# Patient Record
Sex: FEMALE | Race: WHITE | Employment: UNEMPLOYED | URBAN - METROPOLITAN AREA
[De-identification: names, ages, dates, MRNs, and addresses within clinical notes are randomized per-mention and may not be internally consistent; named-entity substitution may affect disease eponyms.]

---

## 2017-04-24 ENCOUNTER — HOSPITAL ENCOUNTER (OUTPATIENT)
Dept: CARDIAC CATH/INVASIVE PROCEDURES | Age: 62
Discharge: HOME | End: 2017-04-24
Attending: ORTHOPAEDIC SURGERY | Admitting: ORTHOPAEDIC SURGERY

## 2017-04-24 DIAGNOSIS — M51.34 BULGE OF THORACIC DISC WITHOUT MYELOPATHY: ICD-10-CM

## 2017-05-24 ENCOUNTER — OFFICE VISIT (OUTPATIENT)
Dept: ORTHOPEDIC SURGERY | Age: 62
End: 2017-05-24

## 2017-05-24 VITALS
DIASTOLIC BLOOD PRESSURE: 79 MMHG | WEIGHT: 166 LBS | HEIGHT: 68 IN | HEART RATE: 80 BPM | BODY MASS INDEX: 25.16 KG/M2 | SYSTOLIC BLOOD PRESSURE: 116 MMHG

## 2017-05-24 DIAGNOSIS — M54.12 CERVICAL RADICULOPATHY: ICD-10-CM

## 2017-05-24 DIAGNOSIS — M48.02 SPINAL STENOSIS OF CERVICAL REGION: ICD-10-CM

## 2017-05-24 DIAGNOSIS — Z80.3 FAMILY HISTORY OF BREAST CANCER: ICD-10-CM

## 2017-05-24 DIAGNOSIS — M75.41 SHOULDER IMPINGEMENT SYNDROME, RIGHT: ICD-10-CM

## 2017-05-24 DIAGNOSIS — M25.511 RIGHT SHOULDER PAIN, UNSPECIFIED CHRONICITY: Primary | ICD-10-CM

## 2017-05-24 DIAGNOSIS — R07.89 CHEST WALL PAIN: ICD-10-CM

## 2017-05-24 PROBLEM — M75.40 SHOULDER IMPINGEMENT SYNDROME: Status: ACTIVE | Noted: 2017-05-24

## 2017-05-24 PROBLEM — M47.812 OSTEOARTHRITIS OF CERVICAL SPINE: Status: ACTIVE | Noted: 2017-05-24

## 2017-05-24 PROCEDURE — 73030 X-RAY EXAM OF SHOULDER: CPT | Performed by: ORTHOPAEDIC SURGERY

## 2017-05-24 PROCEDURE — 99243 OFF/OP CNSLTJ NEW/EST LOW 30: CPT | Performed by: ORTHOPAEDIC SURGERY

## 2017-05-31 ENCOUNTER — HOSPITAL ENCOUNTER (OUTPATIENT)
Dept: NUCLEAR MEDICINE | Age: 62
Discharge: HOME | End: 2017-05-31
Attending: ORTHOPAEDIC SURGERY | Admitting: ORTHOPAEDIC SURGERY

## 2017-05-31 DIAGNOSIS — R07.89 OTHER CHEST PAIN: ICD-10-CM

## 2017-05-31 DIAGNOSIS — Z80.3 FAMILY HISTORY OF BREAST CANCER: ICD-10-CM

## 2017-05-31 DIAGNOSIS — R07.89 CHEST WALL PAIN: ICD-10-CM

## 2017-05-31 RX ORDER — TC 99M MEDRONATE 20 MG/10ML
28.1 INJECTION, POWDER, LYOPHILIZED, FOR SOLUTION INTRAVENOUS
Status: COMPLETED | OUTPATIENT
Start: 2017-05-31 | End: 2017-05-31

## 2017-05-31 RX ADMIN — TC 99M MEDRONATE 28.1 MILLICURIE: 20 INJECTION, POWDER, LYOPHILIZED, FOR SOLUTION INTRAVENOUS at 08:06

## 2017-06-05 ENCOUNTER — HOSPITAL ENCOUNTER (OUTPATIENT)
Dept: PHYSICAL THERAPY | Age: 62
Discharge: HOME | End: 2017-06-05
Admitting: ORTHOPAEDIC SURGERY

## 2017-06-05 ENCOUNTER — HOSPITAL ENCOUNTER (OUTPATIENT)
Dept: PHYSICAL THERAPY | Age: 62
Discharge: HOME | End: 2017-05-31
Admitting: ORTHOPAEDIC SURGERY

## 2017-06-07 ENCOUNTER — OFFICE VISIT (OUTPATIENT)
Dept: ORTHOPEDIC SURGERY | Age: 62
End: 2017-06-07

## 2017-06-07 DIAGNOSIS — M75.41 SHOULDER IMPINGEMENT SYNDROME, RIGHT: ICD-10-CM

## 2017-06-07 DIAGNOSIS — M48.02 SPINAL STENOSIS OF CERVICAL REGION: ICD-10-CM

## 2017-06-07 DIAGNOSIS — M54.12 CERVICAL RADICULOPATHY: ICD-10-CM

## 2017-06-07 DIAGNOSIS — M25.511 RIGHT SHOULDER PAIN, UNSPECIFIED CHRONICITY: Primary | ICD-10-CM

## 2017-06-07 PROCEDURE — 99213 OFFICE O/P EST LOW 20 MIN: CPT | Performed by: PHYSICIAN ASSISTANT

## 2017-06-08 ENCOUNTER — HOSPITAL ENCOUNTER (OUTPATIENT)
Dept: PHYSICAL THERAPY | Age: 62
Discharge: HOME | End: 2017-06-08
Admitting: ORTHOPAEDIC SURGERY

## 2017-06-12 ENCOUNTER — HOSPITAL ENCOUNTER (OUTPATIENT)
Dept: PHYSICAL THERAPY | Age: 62
Discharge: HOME | End: 2017-06-12
Admitting: ORTHOPAEDIC SURGERY

## 2017-06-14 ENCOUNTER — HOSPITAL ENCOUNTER (OUTPATIENT)
Dept: PHYSICAL THERAPY | Age: 62
Discharge: HOME | End: 2017-06-14
Admitting: ORTHOPAEDIC SURGERY

## 2017-06-19 ENCOUNTER — HOSPITAL ENCOUNTER (OUTPATIENT)
Dept: PHYSICAL THERAPY | Age: 62
Discharge: HOME | End: 2017-06-19
Admitting: ORTHOPAEDIC SURGERY

## 2017-06-21 ENCOUNTER — HOSPITAL ENCOUNTER (OUTPATIENT)
Dept: PHYSICAL THERAPY | Age: 62
Discharge: HOME | End: 2017-06-21
Admitting: ORTHOPAEDIC SURGERY

## 2017-06-26 ENCOUNTER — HOSPITAL ENCOUNTER (OUTPATIENT)
Dept: PHYSICAL THERAPY | Age: 62
Discharge: HOME | End: 2017-06-26
Admitting: ORTHOPAEDIC SURGERY

## 2017-06-28 ENCOUNTER — HOSPITAL ENCOUNTER (OUTPATIENT)
Dept: PHYSICAL THERAPY | Age: 62
Discharge: HOME | End: 2017-06-28
Admitting: ORTHOPAEDIC SURGERY

## 2017-07-03 ENCOUNTER — HOSPITAL ENCOUNTER (OUTPATIENT)
Dept: PHYSICAL THERAPY | Age: 62
Discharge: HOME | End: 2017-07-03
Admitting: ORTHOPAEDIC SURGERY

## 2017-07-05 ENCOUNTER — HOSPITAL ENCOUNTER (OUTPATIENT)
Dept: PHYSICAL THERAPY | Age: 62
Discharge: HOME | End: 2017-07-05
Admitting: ORTHOPAEDIC SURGERY

## 2017-07-19 ENCOUNTER — HOSPITAL ENCOUNTER (OUTPATIENT)
Dept: PHYSICAL THERAPY | Age: 62
Discharge: HOME | End: 2017-07-19
Admitting: ORTHOPAEDIC SURGERY

## 2017-07-20 ENCOUNTER — OFFICE VISIT (OUTPATIENT)
Dept: ORTHOPEDIC SURGERY | Age: 62
End: 2017-07-20

## 2017-07-20 VITALS — WEIGHT: 166.01 LBS | BODY MASS INDEX: 25.16 KG/M2 | HEIGHT: 68 IN

## 2017-07-20 DIAGNOSIS — M54.12 CERVICAL RADICULOPATHY: ICD-10-CM

## 2017-07-20 DIAGNOSIS — G25.89 SCAPULAR DYSKINESIS: Primary | ICD-10-CM

## 2017-07-20 PROCEDURE — 99213 OFFICE O/P EST LOW 20 MIN: CPT | Performed by: ORTHOPAEDIC SURGERY

## 2017-09-26 ENCOUNTER — OFFICE VISIT (OUTPATIENT)
Dept: ORTHOPEDIC SURGERY | Age: 62
End: 2017-09-26

## 2017-09-26 VITALS — WEIGHT: 166.01 LBS | HEIGHT: 68 IN | BODY MASS INDEX: 25.16 KG/M2

## 2017-09-26 DIAGNOSIS — M25.562 LEFT KNEE PAIN, UNSPECIFIED CHRONICITY: Primary | ICD-10-CM

## 2017-09-26 PROCEDURE — 99213 OFFICE O/P EST LOW 20 MIN: CPT | Performed by: PHYSICIAN ASSISTANT

## 2017-09-26 PROCEDURE — 73564 X-RAY EXAM KNEE 4 OR MORE: CPT | Performed by: PHYSICIAN ASSISTANT

## 2017-10-09 ENCOUNTER — OFFICE VISIT (OUTPATIENT)
Dept: ORTHOPEDIC SURGERY | Age: 62
End: 2017-10-09

## 2017-10-09 DIAGNOSIS — M94.262 CHONDROMALACIA OF KNEE, LEFT: ICD-10-CM

## 2017-10-09 DIAGNOSIS — S83.282A TEAR OF LATERAL MENISCUS OF LEFT KNEE, UNSPECIFIED TEAR TYPE, UNSPECIFIED WHETHER OLD OR CURRENT TEAR, INITIAL ENCOUNTER: ICD-10-CM

## 2017-10-09 DIAGNOSIS — S83.242A TEAR OF MEDIAL MENISCUS OF LEFT KNEE, UNSPECIFIED TEAR TYPE, UNSPECIFIED WHETHER OLD OR CURRENT TEAR, INITIAL ENCOUNTER: Primary | ICD-10-CM

## 2017-10-09 PROBLEM — M94.269 CHONDROMALACIA OF KNEE: Status: ACTIVE | Noted: 2017-10-09

## 2017-10-09 PROCEDURE — 99214 OFFICE O/P EST MOD 30 MIN: CPT | Performed by: PHYSICIAN ASSISTANT

## 2017-10-09 NOTE — LETTER
because_________________________________________________    The undersigned represents that he or she is duly authorized to execute to this consent for and on the behalf of the above named patient.    ________________________________             __________________________________________  Witness                                                                         Parent/Spouse/Guardian/Other:_________________    Medical Record#  Insurance  Smartphone:  Yes   Or   No  Email:                 You have signed a consent to have a total joint replacement surgery performed. Before you can proceed with surgery the following things must be done. Please use this form as a checklist.      _____   Please schedule your Physical Therapy functional evaluation. This evaluation must be done at the [place] [place] and [facility])                           locations. _____   Please take your lab orders and get your blood work done at [facility] or [facility].      _____  Miquel Myers will need to go to [employer] to complete registration and the medical questionnaire prior to your physical therapy evaluation. Once you have completed both the labs and the evaluation please call Sandra Ask @ [phone] to let her know. Once verification of the PT Evaluation and completed labs has been determined you will be called and set up for surgery. This may take 1-2 days to check results and return your phone call.

## 2017-10-09 NOTE — PROGRESS NOTES
Chief Complaint    Results (MRI LT KNEE)      History of Present Illness: Penny Silva is a 64 y.o. female returns today for follow-up evaluation on her left knee after undergoing a recent MRI. Patient does suffer from rheumatoid arthritis and ulcerative colitis. She has had pain in her left knee since 2015. In February 2016 she noticed an increase in pain and swelling involving her left knee without injury or trauma. She has pain over the anterior and posterior aspects of her knee. No injury or trauma. Increased symptoms with activities. The results of the MRI indicate that she does have grade 3-4 chondral changes of all 3 compartments of the knee most pronounced in the medial compartment and patellofemoral joint. She also has a very large, chronic cleavage tear of the medial meniscus measuring 4.5 cm. Cruciate collateral ligaments are all intact. There is also evidence of a dehisced Baker cyst.  Full radiology report as below. Pain Assessment  Location of Pain: Knee  Location Modifiers: Left  Severity of Pain: 10  Frequency of Pain: Intermittent  Aggravating Factors: Other (Comment)  Limiting Behavior: Yes  Work-Related Injury: No  Are there other pain locations you wish to document?: No    Medical History:  Patient's medications, allergies, past medical, surgical, social and family histories were reviewed and updated as appropriate. Review of Systems:  Relevant review of systems reviewed and available in the patient's chart    Vital Signs: There were no vitals taken for this visit. General Exam:   Constitutional: Patient is adequately groomed with no evidence of malnutrition  DTRs: Deep tendon reflexes are intact  Mental Status: The patient is oriented to time, place and person. The patient's mood and affect are appropriate. Lymphatic: The lymphatic examination bilaterally reveals all areas to be without enlargement or induration.   Vascular: Examination reveals no swelling or We've recommended a left knee arthroscopy, partial medial meniscectomy, chondroplasty. Patient understands that in lieu of the arthritic findings of her knee she'll likely continue to have some postoperative arthritic symptoms and intermittent development of the Baker's cyst from time to time. We'll have to treat that accordingly moving forward. She voices a good understanding and agrees with this recommendation. We discussed the risks, benefits, and complications of arthroscopic knee surgery. The patient realizes that there are concerns with this surgery with respect to infection, deep vein thrombosis, neurological injury, delayed  rehabilitation, the possibility of arthrofibrosis of the knee, and specifically  Hoffa's fat pad fibrosis that can potentially cause difficulties. The patient realizes that there are also anesthetic concerns including cardiopulmonary issues, pulmonary issues, and even possibility of death or dystrophy. The patient voiced understanding to this as well as the normal  rehabilitation  that   is involved with weeks of physical therapy, exercise, and strengthening. The patient also realizes that even though the surgery, from a functional perspective, typically allows the patient to return to good function at about 6 weeks, that it often takes 6 months to completely rehabilitate from this operation. The patient also realizes that if there is an arthritic component to the symptoms, then they may still have some degree of arthritis pain.

## 2017-11-01 ENCOUNTER — HOSPITAL ENCOUNTER (OUTPATIENT)
Dept: PHYSICAL THERAPY | Age: 62
Discharge: HOME | End: 2017-11-30
Attending: ORTHOPAEDIC SURGERY | Admitting: ORTHOPAEDIC SURGERY

## 2017-11-02 ENCOUNTER — HOSPITAL ENCOUNTER (OUTPATIENT)
Dept: SURGERY | Age: 62
Discharge: HOME | End: 2017-11-02
Attending: INTERNAL MEDICINE | Admitting: INTERNAL MEDICINE

## 2017-11-02 ENCOUNTER — TELEPHONE (OUTPATIENT)
Dept: ORTHOPEDIC SURGERY | Age: 62
End: 2017-11-02

## 2017-11-02 VITALS
SYSTOLIC BLOOD PRESSURE: 120 MMHG | BODY MASS INDEX: 24.25 KG/M2 | HEIGHT: 68 IN | TEMPERATURE: 99.8 F | RESPIRATION RATE: 20 BRPM | OXYGEN SATURATION: 98 % | DIASTOLIC BLOOD PRESSURE: 73 MMHG | HEART RATE: 80 BPM | WEIGHT: 160 LBS

## 2017-11-02 RX ORDER — LIDOCAINE HYDROCHLORIDE 10 MG/ML
0.1 INJECTION, SOLUTION EPIDURAL; INFILTRATION; INTRACAUDAL; PERINEURAL
Status: ACTIVE | OUTPATIENT
Start: 2017-11-02 | End: 2017-11-02

## 2017-11-02 RX ORDER — SODIUM CHLORIDE, SODIUM LACTATE, POTASSIUM CHLORIDE, CALCIUM CHLORIDE 600; 310; 30; 20 MG/100ML; MG/100ML; MG/100ML; MG/100ML
INJECTION, SOLUTION INTRAVENOUS ONCE
Status: COMPLETED | OUTPATIENT
Start: 2017-11-02 | End: 2017-11-02

## 2017-11-02 RX ADMIN — SODIUM CHLORIDE, SODIUM LACTATE, POTASSIUM CHLORIDE, CALCIUM CHLORIDE: 600; 310; 30; 20 INJECTION, SOLUTION INTRAVENOUS at 08:04

## 2017-11-02 ASSESSMENT — PAIN SCALES - GENERAL
PAINLEVEL_OUTOF10: 0

## 2017-11-02 ASSESSMENT — PAIN - FUNCTIONAL ASSESSMENT: PAIN_FUNCTIONAL_ASSESSMENT: 0-10

## 2017-11-02 NOTE — PROGRESS NOTES
Pt advanced from lying to sitting at side of bed without adverse events: VSS/RESP WNL  abd assessment unchanged- denies pain /tolerating oral liquids without ponv    Physician  out to see pt and family and discuss exam results with family /significant other/pts family verbalized understanding of postop activity restrictions -pt/family denies additional questions    Reviewed discharge instructions with family  And pt  /verbalized understanding - all questions /concerns addressed  Educational materials given to pt and explained- all questions answered    Pain level =0

## 2017-11-02 NOTE — BRIEF OP NOTE
Es Lang   11/2/2017  Colonoscopy  A pre-procedure re-evaluation was performed immediately prior to the procedure.   Preprocedure Dx: polyp diarrhea   Postprocedure Dx: Colonic Polyps  Medications: Procedural sedation with Versed & Fentanyl  Complications: None  Estimated Blood Loss: <5cc  Specimens: were obtained  Anil Orlando

## 2017-11-10 ENCOUNTER — HOSPITAL ENCOUNTER (OUTPATIENT)
Dept: SURGERY | Age: 62
Discharge: HOME | End: 2017-11-10
Attending: ORTHOPAEDIC SURGERY | Admitting: ORTHOPAEDIC SURGERY

## 2017-11-10 VITALS
SYSTOLIC BLOOD PRESSURE: 116 MMHG | TEMPERATURE: 97 F | RESPIRATION RATE: 16 BRPM | OXYGEN SATURATION: 96 % | DIASTOLIC BLOOD PRESSURE: 71 MMHG | HEART RATE: 81 BPM

## 2017-11-10 RX ORDER — ONDANSETRON 2 MG/ML
4 INJECTION INTRAMUSCULAR; INTRAVENOUS EVERY 30 MIN PRN
Status: DISCONTINUED | OUTPATIENT
Start: 2017-11-10 | End: 2017-11-11 | Stop reason: HOSPADM

## 2017-11-10 RX ORDER — MEPERIDINE HYDROCHLORIDE 50 MG/ML
12.5 INJECTION INTRAMUSCULAR; INTRAVENOUS; SUBCUTANEOUS EVERY 5 MIN PRN
Status: DISCONTINUED | OUTPATIENT
Start: 2017-11-10 | End: 2017-11-11 | Stop reason: HOSPADM

## 2017-11-10 RX ORDER — LIDOCAINE HYDROCHLORIDE 10 MG/ML
1 INJECTION, SOLUTION EPIDURAL; INFILTRATION; INTRACAUDAL; PERINEURAL
Status: ACTIVE | OUTPATIENT
Start: 2017-11-10 | End: 2017-11-10

## 2017-11-10 RX ORDER — HYDRALAZINE HYDROCHLORIDE 20 MG/ML
5 INJECTION INTRAMUSCULAR; INTRAVENOUS EVERY 30 MIN PRN
Status: DISCONTINUED | OUTPATIENT
Start: 2017-11-10 | End: 2017-11-11 | Stop reason: HOSPADM

## 2017-11-10 RX ORDER — SODIUM CHLORIDE 0.9 % (FLUSH) 0.9 %
10 SYRINGE (ML) INJECTION EVERY 12 HOURS SCHEDULED
Status: DISCONTINUED | OUTPATIENT
Start: 2017-11-10 | End: 2017-11-11 | Stop reason: HOSPADM

## 2017-11-10 RX ORDER — LABETALOL HYDROCHLORIDE 5 MG/ML
5 INJECTION, SOLUTION INTRAVENOUS
Status: DISCONTINUED | OUTPATIENT
Start: 2017-11-10 | End: 2017-11-11 | Stop reason: HOSPADM

## 2017-11-10 RX ORDER — SODIUM CHLORIDE, SODIUM LACTATE, POTASSIUM CHLORIDE, CALCIUM CHLORIDE 600; 310; 30; 20 MG/100ML; MG/100ML; MG/100ML; MG/100ML
INJECTION, SOLUTION INTRAVENOUS CONTINUOUS
Status: DISCONTINUED | OUTPATIENT
Start: 2017-11-10 | End: 2017-11-11 | Stop reason: HOSPADM

## 2017-11-10 RX ORDER — OXYCODONE HYDROCHLORIDE AND ACETAMINOPHEN 5; 325 MG/1; MG/1
2 TABLET ORAL PRN
Status: ACTIVE | OUTPATIENT
Start: 2017-11-10 | End: 2017-11-10

## 2017-11-10 RX ORDER — DIPHENHYDRAMINE HYDROCHLORIDE 50 MG/ML
6.25 INJECTION INTRAMUSCULAR; INTRAVENOUS
Status: ACTIVE | OUTPATIENT
Start: 2017-11-10 | End: 2017-11-10

## 2017-11-10 RX ORDER — HYDROCODONE BITARTRATE AND ACETAMINOPHEN 5; 325 MG/1; MG/1
1 TABLET ORAL EVERY 6 HOURS PRN
Qty: 40 TABLET | Refills: 0 | Status: SHIPPED | OUTPATIENT
Start: 2017-11-10 | End: 2017-11-17

## 2017-11-10 RX ORDER — SODIUM CHLORIDE 0.9 % (FLUSH) 0.9 %
10 SYRINGE (ML) INJECTION PRN
Status: DISCONTINUED | OUTPATIENT
Start: 2017-11-10 | End: 2017-11-11 | Stop reason: HOSPADM

## 2017-11-10 RX ORDER — OXYCODONE HYDROCHLORIDE AND ACETAMINOPHEN 5; 325 MG/1; MG/1
1 TABLET ORAL PRN
Status: ACTIVE | OUTPATIENT
Start: 2017-11-10 | End: 2017-11-10

## 2017-11-10 RX ADMIN — SODIUM CHLORIDE, SODIUM LACTATE, POTASSIUM CHLORIDE, CALCIUM CHLORIDE: 600; 310; 30; 20 INJECTION, SOLUTION INTRAVENOUS at 09:27

## 2017-11-10 ASSESSMENT — PAIN SCALES - GENERAL
PAINLEVEL_OUTOF10: 0

## 2017-11-10 NOTE — ANESTHESIA PRE-OP
Department of Anesthesiology  Preprocedure Note       Name:  Morales Hinton   Age:  64 y.o.  :  1955                                          MRN:  [id number]         Date:  11/10/2017      Surgeon:    Procedure:    Medications prior to admission:   Prior to Admission medications    Medication Sig Start Date End Date Taking? Authorizing Provider   HYDROcodone-acetaminophen (NORCO) 5-325 MG per tablet Take 1 tablet by mouth every 6 hours as needed for Pain . 11/10/17 11/17/17 Yes Michi Oconnell MD   OMEPRAZOLE PO Take 20 mg by mouth daily   Yes Historical Provider, MD   BUDESONIDE PO Take 3 mg by mouth 3 times daily   Yes Historical Provider, MD   adalimumab (HUMIRA) 40 MG/0.8ML injection Inject 40 mg into the skin 17  Yes Historical Provider, MD   Multiple Vitamins-Minerals (THERAPEUTIC MULTIVITAMIN-MINERALS) tablet Take 1 tablet by mouth daily   Yes Historical Provider, MD   Cholecalciferol (VITAMIN D3) 2000 UNITS CAPS Take by mouth   Yes Historical Provider, MD   Calcium Carb-Cholecalciferol (CALCIUM + D3 PO) Take by mouth   Yes Historical Provider, MD       Current medications:    Current Outpatient Prescriptions   Medication Sig Dispense Refill    HYDROcodone-acetaminophen (NORCO) 5-325 MG per tablet Take 1 tablet by mouth every 6 hours as needed for Pain .  40 tablet 0    OMEPRAZOLE PO Take 20 mg by mouth daily      BUDESONIDE PO Take 3 mg by mouth 3 times daily      adalimumab (HUMIRA) 40 MG/0.8ML injection Inject 40 mg into the skin      Multiple Vitamins-Minerals (THERAPEUTIC MULTIVITAMIN-MINERALS) tablet Take 1 tablet by mouth daily      Cholecalciferol (VITAMIN D3) 2000 UNITS CAPS Take by mouth      Calcium Carb-Cholecalciferol (CALCIUM + D3 PO) Take by mouth       Current Facility-Administered Medications   Medication Dose Route Frequency Provider Last Rate Last Dose    lactated ringers infusion   Intravenous Continuous Kayley Villafuerte  mL/hr at 11/10/17 0927      sodium chloride flush 0.9 % injection 10 mL  10 mL Intravenous 2 times per day Jonah Madrid MD        sodium chloride flush 0.9 % injection 10 mL  10 mL Intravenous PRN Jonah Madrid MD        lidocaine PF 1 % injection 1 mL  1 mL Intradermal Once PRN Jonah Madrid MD        HYDROmorphone (DILAUDID) injection 0.5 mg  0.5 mg Intravenous Q10 Min PRN Jagdeep Engle MD        HYDROmorphone (DILAUDID) injection 0.5 mg  0.5 mg Intravenous Q5 Min PRN Jagdeep Engle MD        oxyCODONE-acetaminophen (PERCOCET) 5-325 MG per tablet 1 tablet  1 tablet Oral PRN Jagdeep Engle MD        Or    oxyCODONE-acetaminophen (PERCOCET) 5-325 MG per tablet 2 tablet  2 tablet Oral PRN Jagdeep Engle MD        diphenhydrAMINE (BENADRYL) injection 6.25 mg  6.25 mg Intravenous Once PRN Jagdeep Engle MD        ondansetron [facility]) injection 4 mg  4 mg Intravenous Q30 Min PRN Jagdeep Engle MD        labetalol (NORMODYNE;TRANDATE) injection 5 mg  5 mg Intravenous Q15 Min PRN Jagdeep Engle MD        hydrALAZINE (APRESOLINE) injection 5 mg  5 mg Intravenous Q30 Min PRN Jagdeep Engle MD        meperidine (DEMEROL) injection 12.5 mg  12.5 mg Intravenous Q5 Min PRN Jagdeep Engle MD           Allergies:     Allergies   Allergen Reactions    Methotrexate Derivatives      Rash swelling    Plaquenil [Hydroxychloroquine]      Rash swelling    Chlorhexidine Gluconate Rash       Problem List:    Patient Active Problem List   Diagnosis Code    Family history of breast cancer Z80.3    Chest wall pain R07.89    Cervical radiculopathy M54.12    Shoulder impingement syndrome M75.40    Right shoulder pain M25.511    Spinal stenosis of cervical region M48.02    Scapular dyskinesis G25.89    Tear of medial meniscus of left knee S83.242A    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 Anesthetic plan and risks discussed with patient. Plan discussed with CRNA.                   Gisselle Lujan MD   11/10/2017

## 2017-11-10 NOTE — H&P
I have reviewed the history and physical and examined the patient and find no relevant changes. I have reviewed with the patient and/or family the risks, benefits, and alternatives to the procedure.    H&P REVIEWED AND IS IN CHART/EPIC   IT WILL NOT BE DUPLICATED

## 2017-11-10 NOTE — OP NOTE
[address]                                 OPERATIVE REPORT    PATIENT NAME: Kelly Valle                     :        1955  MED REC NO:   [id number]                          ROOM:  ACCOUNT NO:   [de-identified]                          ADMIT DATE: 11/10/2017  PROVIDER:     Leda Romero MD    DATE OF PROCEDURE:  11/10/2017    PREOPERATIVE DIAGNOSES:  Left knee medial meniscus tear, lateral meniscus  tear, chondromalacia of the patella, rheumatoid arthritis with severe  synovitis, and multiple loose bodies. POSTOPERATIVE DIAGNOSES:  Left knee medial meniscus tear, lateral meniscus  tear, chondromalacia of the patella, rheumatoid arthritis with severe  synovitis, and multiple loose bodies. OPERATION PERFORMED:  Left knee diagnostic video arthroscopy, arthroscopic  partial medial meniscectomy, partial lateral meniscectomy, chondroplasty of  the medial compartment, patellofemoral joint, and synovectomy with removal  of multiple loose bodies. PRIMARY SURGEON:  Leda Romero MD    ANESTHESIA:  General.    COMPLICATIONS:  None. PRE AND POSTOP CONDITION:  To recovery room. INDICATION FOR SURGERY:  The patient is a 72-year-old white female who has  had a long-history of left knee pain, refractory to conservative  management, affecting her activities of daily living. She was apprised of  risks, benefits, and alternatives of surgery as well as limitations of the  surgery especially in light of the preexisting rheumatoid and  osteoarthritis. She agreed with these and wished to proceed with the  surgery. DETAILS OF THE PROCEDURE:  The patient was brought to the operating room. After administration of general anesthesia, she was placed on the OR table  in a standard arthroscopic position. The left lower extremity was then  prepped and draped in the normal sterile fashion.   The limb

## 2017-11-14 ENCOUNTER — HOSPITAL ENCOUNTER (OUTPATIENT)
Dept: PHYSICAL THERAPY | Age: 62
Discharge: HOME | End: 2017-11-14
Admitting: ORTHOPAEDIC SURGERY

## 2017-11-14 ENCOUNTER — HOSPITAL ENCOUNTER (OUTPATIENT)
Dept: PHYSICAL THERAPY | Age: 62
Discharge: HOME | End: 2017-10-31
Admitting: ORTHOPAEDIC SURGERY

## 2017-11-14 DIAGNOSIS — M94.262 CHONDROMALACIA OF LEFT KNEE: ICD-10-CM

## 2017-11-14 DIAGNOSIS — S83.272D COMPLEX TEAR OF LATERAL MENISCUS OF LEFT KNEE, UNSPECIFIED WHETHER OLD OR CURRENT TEAR, SUBSEQUENT ENCOUNTER: ICD-10-CM

## 2017-11-14 DIAGNOSIS — S83.232D COMPLEX TEAR OF MEDIAL MENISCUS OF LEFT KNEE, UNSPECIFIED WHETHER OLD OR CURRENT TEAR, SUBSEQUENT ENCOUNTER: Primary | ICD-10-CM

## 2017-11-14 NOTE — PLAN OF CARE
[facility], [address]  Phone: [phone]  Fax [phone]     Physical Therapy Certification    Dear Referring Practitioner: Dominique Coe,    We had the pleasure of evaluating the following patient for physical therapy services at [address] [place] [address]. A summary of our findings can be found in the initial assessment below. This includes our plan of care. If you have any questions or concerns regarding these findings, please do not hesitate to contact me at the office phone number checked above. Thank you for the referral.       Physician Signature:_______________________________Date:__________________  By signing above (or electronic signature), therapists plan is approved by physician    Patient: Zachery Keys   : 1955   MRN: [id number]  Referring Physician: Referring Practitioner: Dominique Coe      Evaluation Date: 2017      Medical Diagnosis Information:  Diagnosis: Left knee pain (M25.562) s/p left knee PMM, PLM, chondroplasty DOS 11/10/17   Treatment Diagnosis: Left knee pain (M25.562)                                         Insurance information: PT Insurance Information: AETNA     Precautions/ Contra-indications:   Latex Allergy:  [x]NO      []YES  Preferred Language for Healthcare:   [x]English       []other:    SUBJECTIVE: Patient stated complaint:Patient reports she has been having pain in her left knee for a couple years with no specific BRIGIDA. Pain has gradually increased to the point where she needed to get it looked at. Imaging showed Meniscus tears. Had surgery 11/10/17. Since the surgery has had minimal pain unless she moves it too quick a certain way.      Relevant Medical History: hx of RA, OA  Functional Disability Index:PT G-Codes  Functional Assessment Tool Used: LEFS  Score: 84%  Functional Limitation: Mobility: Walking and moving around  Mobility: Walking and Moving Around Current Status (): At least 80 percent but less than 100 percent impaired, limited or restricted  Mobility: Walking and Moving Around Goal Status (): At least 40 percent but less than 60 percent impaired, limited or restricted    Pain Scale: 2/10  Easing factors: Rest, ice  Provocative factors: standing, walking, bending it too far     Type: []Constant   [x]Intermittent  []Radiating []Localized []other:     Numbness/Tingling: Denies N/T    Occupation/School:Retired    Living Status/Prior Level of Function: Independent with ADLs and IADLs, lives with  in 2 story house. Bedroom located on second floor with railing on right and half railing on left. Enjoys walking. OBJECTIVE:     ROM LEFT RIGHT   HIP Flex     HIP Abd     HIP Ext     HIP IR     HIP ER     Knee ext -7    Knee Flex 65    Ankle PF     Ankle DF     Ankle In     Ankle Ev     Strength  LEFT RIGHT   HIP Flexors     HIP Abductors     HIP Ext     Hip ER     Knee EXT (quad) Poor +    Knee Flex (HS)     Ankle DF     Ankle PF     Ankle Inv     Ankle EV          Circumference  Mid apex  7 cm prox             Reflexes/Sensation: DNT   []Dermatomes/Myotomes intact    []Reflexes equal and normal bilaterally   []Other:    Joint mobility:    []Normal    [x]Hypo due to edema   []Hyper    Palpation: Mild TTP medial and lateral joint lines    Functional Mobility/Transfers: Indepenedent    Posture: Fair    Bandages/Dressings/Incisions: Incisions healing with no signs/symptoms of infection    Gait: (include devices/WB status) Ambulating with one crutch on right side with knee in extension    Orthopedic Special Tests: None this date                       [x] Patient history, allergies, meds reviewed. Medical chart reviewed. See intake form. Review Of Systems (ROS):  [x]Performed Review of systems (Integumentary, CardioPulmonary, Neurological) by intake and observation. Intake form has been scanned into medical record.  Patient has been instructed indicated for LE, Hip and spine to include: Dry Needling/IASTM, STM, PROM, Gr I-IV mobilizations, manipulation. [x] Modalities as needed that may include: thermal agents, E-stim, Biofeedback, US, iontophoresis as indicated  [x] Patient education on joint protection, postural re-education, activity modification, progression of HEP. HEP instruction: Handout given to patient this date neftali)    GOALS:  Patient stated goal: \"To feel better and get back to my normal activities. \"    Therapist goals for Patient:   Short Term Goals: To be achieved in: 2 weeks  1. Independent in HEP and progression per patient tolerance, in order to prevent re-injury. 2. Patient will have a decrease in pain to facilitate improvement in movement, function, and ADLs as indicated by Functional Deficits. Long Term Goals: To be achieved in: 8 weeks  1. Disability index score of 40% or less for the LEFS to assist with reaching prior level of function. 2. Patient will demonstrate increased AROM to 0 - 125 degrees to allow for proper joint functioning as indicated by patients Functional Deficits. 3. Patient will demonstrate an increase in Strength in knee flexion and extension to 4+/5 to allow for proper functional mobility as indicated by patients Functional Deficits. 4. Patient will be able to ascend/descend a flight of stairs reciprocally without pain. 5. Patient will demonstrate a normal gait pattern with no AD.        Electronically signed by:  Lesly De La Cruz, DPT [id number]

## 2017-11-14 NOTE — FLOWSHEET NOTE
[facility],  [address]  Phone: [phone]  Fax [phone]    Physical Therapy Daily Treatment Note  Date:  2017    Patient Name:  Mehreen Guillen    :  1955  MRN: [id number]  Restrictions/Precautions:    Medical/Treatment Diagnosis Information:  · Diagnosis: Left knee pain (M25.562) s/p left knee PMM, PLM, chondroplasty DOS 11/10/17  · Treatment Diagnosis: Left knee pain (Z04.689)  Insurance/Certification information:  PT Insurance Information: [id number] [employer].  Physician Information:  Referring Practitioner: Naomy Alexis of [facility] signed (Y/N):     Date of Patient follow up with Physician:     G-Code (if applicable):      Date G-Code Applied:    PT G-Codes  Functional Assessment Tool Used: LEFS  Score: 84%  Functional Limitation: Mobility: Walking and moving around  Mobility: Walking and Moving Around Current Status (): At least 80 percent but less than 100 percent impaired, limited or restricted  Mobility: Walking and Moving Around Goal Status ():  At least 40 percent but less than 60 percent impaired, limited or restricted    Progress Note: [x]  Yes  []  No  Next due by: Visit #10       Latex Allergy:  [x]NO      []YES  Preferred Language for Healthcare:   [x]English       []other:    Visit # Insurance Allowable   1      Pain level:  210     SUBJECTIVE:  See eval    OBJECTIVE: See eval  Observation:   Test measurements:      RESTRICTIONS/PRECAUTIONS:  OA,RA    Exercises/Interventions:     Therapeutic Ex Sets/sec Reps Notes   HS stretch 30\" 3    Gastroc belt stretch 30\" 3    Quad sets 5\" 15x    Heel slides with belt 5\" 10x    SLR flexion 2 5                Pt ed   HEP, POC, Ice, gait mechanics, activity modification         Gait training/stair training 5'           Manual Intervention                                          NMR re-education                                              Therapeutic for normal function with self care, mobility, lifting and ambulation. Modalities:  HV/CP x 15' while seated     Charges:  Timed Code Treatment Minutes: 25'   Total Treatment Minutes: 54'     [x] EVAL (LOW) 64221 (typically 20 minutes face-to-face)  [] EVAL (MOD) 85520 (typically 30 minutes face-to-face)  [] EVAL (HIGH) 39308 (typically 45 minutes face-to-face)  [] RE-EVAL     [x] AD(63072) x  2   [] IONTO  [] NMR (28229) x      [] VASO  [] Manual (99076) x       [] Other:  [] TA x       [] Mech Traction (56877)  [] ES(attended) (52891)      [x] ES (un) (96408):     GOALS:   Short Term Goals: To be achieved in: 2 weeks  1. Independent in HEP and progression per patient tolerance, in order to prevent re-injury. 2. Patient will have a decrease in pain to facilitate improvement in movement, function, and ADLs as indicated by Functional Deficits.     Long Term Goals: To be achieved in: 8 weeks  1. Disability index score of 40% or less for the LEFS to assist with reaching prior level of function. 2. Patient will demonstrate increased AROM to 0 - 125 degrees to allow for proper joint functioning as indicated by patients Functional Deficits. 3. Patient will demonstrate an increase in Strength in knee flexion and extension to 4+/5 to allow for proper functional mobility as indicated by patients Functional Deficits. 4. Patient will be able to ascend/descend a flight of stairs reciprocally without pain. 5. Patient will demonstrate a normal gait pattern with no AD. Progression Towards Functional goals:  [] Patient is progressing as expected towards functional goals listed. [] Progression is slowed due to complexities listed. [] Progression has been slowed due to co-morbidities.   [x] Plan just implemented, too soon to assess goals progression  [] Other:     ASSESSMENT:  See eval    Treatment/Activity Tolerance:  [x] Patient tolerated treatment well [] Patient limited by victor hugo  [] Patient limited by pain  [] Patient limited by other medical complications  [] Other:     Prognosis: [] Good [x] Fair  [] Poor    Patient Requires Follow-up: [x] Yes  [] No    PLAN: See eval  [] Continue per plan of care [] Alter current plan (see comments)  [x] Plan of care initiated [] Hold pending MD visit [] Discharge    Electronically signed by: Norma Glez, PT, DPT [id number]

## 2017-11-16 ENCOUNTER — HOSPITAL ENCOUNTER (OUTPATIENT)
Dept: PHYSICAL THERAPY | Age: 62
Discharge: HOME | End: 2017-11-16
Admitting: ORTHOPAEDIC SURGERY

## 2017-11-16 NOTE — FLOWSHEET NOTE
[facility],  [address] [place]  Phone: [phone]  Fax [phone]    Physical Therapy Daily Treatment Note  Date:  2017    Patient Name:  Laura Barker    :  1955  MRN: [id number]  Restrictions/Precautions:    Medical/Treatment Diagnosis Information:  · Diagnosis: Left knee pain (M25.562) s/p left knee PMM, PLM, chondroplasty DOS 11/10/17  · Treatment Diagnosis: Left knee pain (R29.289)  Insurance/Certification information:  PT Insurance Information: [id number] [employer].  Physician Information:  Referring Practitioner: Jose Flores of care signed (Y/N):     Date of Patient follow up with Physician:     G-Code (if applicable):      Date G-Code Applied:         Progress Note: [x]  Yes  []  No  Next due by: Visit #10       Latex Allergy:  [x]NO      []YES  Preferred Language for Healthcare:   [x]English       []other:    Visit # Insurance Allowable   2 60 (11 used)     Pain level:  2/10     SUBJECTIVE:  Patient reports she is a little more sore. Has been doing exercises. Bending is improving.      OBJECTIVE:   Observation:   Test measurements: SB rolls flexion 91 degrees     RESTRICTIONS/PRECAUTIONS:  OA,RA    Exercises/Interventions:     Therapeutic Ex Sets/sec Reps Notes         Quad sets 5\" 15x    SB rolls flexino 5\" 20x    SLR flexion 3 5    Bridging  5\" 15x    SAQ 5\" 15x    Clamshells 5\" 15x                Incline stretch 30\" 3    Bike 5'     Minisquats 3\" 10x          Gait training 5'  1 crutch         Manual Intervention      HS stretch, hip flexor stretch, patellar mobs, edema massage 10'                                   NMR re-education                                              Therapeutic Exercise and NMR EXR  [x] (52356) Provided verbal/tactile cueing for activities related to strengthening, flexibility, endurance, ROM for improvements in LE, proximal hip, and core control with self care, mobility, lifting, ambulation.  [] (70445) Provided verbal/tactile cueing for activities related to improving balance, coordination, kinesthetic sense, posture, motor skill, proprioception  to assist with LE, proximal hip, and core control in self care, mobility, lifting, ambulation and eccentric single leg control. NMR and Therapeutic Activities:    [] (14516 or 06342) Provided verbal/tactile cueing for activities related to improving balance, coordination, kinesthetic sense, posture, motor skill, proprioception and motor activation to allow for proper function of core, proximal hip and LE with self care and ADLs  [] (51378) Gait Re-education- Provided training and instruction to the patient for proper LE, core and proximal hip recruitment and positioning and eccentric body weight control with ambulation re-education including up and down stairs     Home Exercise Program:    [x] (41007) Reviewed/Progressed HEP activities related to strengthening, flexibility, endurance, ROM of core, proximal hip and LE for functional self-care, mobility, lifting and ambulation/stair navigation   [] (37818)Reviewed/Progressed HEP activities related to improving balance, coordination, kinesthetic sense, posture, motor skill, proprioception of core, proximal hip and LE for self care, mobility, lifting, and ambulation/stair navigation      Manual Treatments:  PROM / STM / Oscillations-Mobs:  G-I, II, III, IV (PA's, Inf., Post.)  [] (80434) Provided manual therapy to mobilize LE, proximal hip and/or LS spine soft tissue/joints for the purpose of modulating pain, promoting relaxation,  increasing ROM, reducing/eliminating soft tissue swelling/inflammation/restriction, improving soft tissue extensibility and allowing for proper ROM for normal function with self care, mobility, lifting and ambulation.      Modalities:  HV/CP x 15' while seated     Charges:  Timed Code Treatment Minutes: 45'   Total Treatment Minutes: 48'     [] EVAL (LOW) 455 1423 (typically 20 minutes face-to-face)  [] EVAL (MOD) 92052 (typically 30 minutes face-to-face)  [] EVAL (HIGH) 17286 (typically 45 minutes face-to-face)  [] RE-EVAL     [x] BK(25230) x  2   [] IONTO  [] NMR (68199) x      [] VASO  [x] Manual (19258) x  1    [] Other:  [] TA x       [] Mech Traction (45114)  [] ES(attended) (21595)      [x] ES (un) (76761):     GOALS:   Short Term Goals: To be achieved in: 2 weeks  1. Independent in HEP and progression per patient tolerance, in order to prevent re-injury. 2. Patient will have a decrease in pain to facilitate improvement in movement, function, and ADLs as indicated by Functional Deficits.     Long Term Goals: To be achieved in: 8 weeks  1. Disability index score of 40% or less for the LEFS to assist with reaching prior level of function. 2. Patient will demonstrate increased AROM to 0 - 125 degrees to allow for proper joint functioning as indicated by patients Functional Deficits. 3. Patient will demonstrate an increase in Strength in knee flexion and extension to 4+/5 to allow for proper functional mobility as indicated by patients Functional Deficits. 4. Patient will be able to ascend/descend a flight of stairs reciprocally without pain. 5. Patient will demonstrate a normal gait pattern with no AD. Progression Towards Functional goals:  [x] Patient is progressing as expected towards functional goals listed. [] Progression is slowed due to complexities listed. [] Progression has been slowed due to co-morbidities. [] Plan just implemented, too soon to assess goals progression  [] Other:     ASSESSMENT:  Patient initially ambulating with heel strike but minimal knee flexion. Improved greatly after cueing. Educated patient on activity modification and the importance of icing.      Treatment/Activity Tolerance:  [x] Patient tolerated treatment well [] Patient limited by fatique  [] Patient limited by pain  [] Patient limited by other medical complications  [] Other:     Prognosis: [] Good [x] Fair  [] Poor    Patient Requires Follow-up: [x] Yes  [] No    PLAN: See eval  [x] Continue per plan of care [] Alter current plan (see comments)  [] Plan of care initiated [] Hold pending MD visit [] Discharge    Electronically signed by: Cecelia Aceves PT, DPT [id number]

## 2017-11-17 ENCOUNTER — OFFICE VISIT (OUTPATIENT)
Dept: ORTHOPEDIC SURGERY | Age: 62
End: 2017-11-17

## 2017-11-17 VITALS — BODY MASS INDEX: 25.16 KG/M2 | WEIGHT: 166 LBS | HEIGHT: 68 IN

## 2017-11-17 DIAGNOSIS — S83.232D COMPLEX TEAR OF MEDIAL MENISCUS OF LEFT KNEE, UNSPECIFIED WHETHER OLD OR CURRENT TEAR, SUBSEQUENT ENCOUNTER: Primary | ICD-10-CM

## 2017-11-17 PROCEDURE — 99024 POSTOP FOLLOW-UP VISIT: CPT | Performed by: PHYSICIAN ASSISTANT

## 2017-11-20 ENCOUNTER — HOSPITAL ENCOUNTER (OUTPATIENT)
Dept: PHYSICAL THERAPY | Age: 62
Discharge: HOME | End: 2017-11-20
Admitting: ORTHOPAEDIC SURGERY

## 2017-11-20 NOTE — FLOWSHEET NOTE
care, mobility, lifting, ambulation.  [] (65477) Provided verbal/tactile cueing for activities related to improving balance, coordination, kinesthetic sense, posture, motor skill, proprioception  to assist with LE, proximal hip, and core control in self care, mobility, lifting, ambulation and eccentric single leg control. NMR and Therapeutic Activities:    [] (00778 or 48428) Provided verbal/tactile cueing for activities related to improving balance, coordination, kinesthetic sense, posture, motor skill, proprioception and motor activation to allow for proper function of core, proximal hip and LE with self care and ADLs  [] (19881) Gait Re-education- Provided training and instruction to the patient for proper LE, core and proximal hip recruitment and positioning and eccentric body weight control with ambulation re-education including up and down stairs     Home Exercise Program:    [x] (38937) Reviewed/Progressed HEP activities related to strengthening, flexibility, endurance, ROM of core, proximal hip and LE for functional self-care, mobility, lifting and ambulation/stair navigation   [] (43848)Reviewed/Progressed HEP activities related to improving balance, coordination, kinesthetic sense, posture, motor skill, proprioception of core, proximal hip and LE for self care, mobility, lifting, and ambulation/stair navigation      Manual Treatments:  PROM / STM / Oscillations-Mobs:  G-I, II, III, IV (PA's, Inf., Post.)  [x] (59141) Provided manual therapy to mobilize LE, proximal hip and/or LS spine soft tissue/joints for the purpose of modulating pain, promoting relaxation,  increasing ROM, reducing/eliminating soft tissue swelling/inflammation/restriction, improving soft tissue extensibility and allowing for proper ROM for normal function with self care, mobility, lifting and ambulation.      Modalities:  HV/CP x 15' while seated     Charges:  Timed Code Treatment Minutes: 40'   Total Treatment Minutes: 54'     [] Requires Follow-up: [x] Yes  [] No    PLAN: See eval  [x] Continue per plan of care [] Alter current plan (see comments)  [] Plan of care initiated [] Hold pending MD visit [] Discharge    Electronically signed by: Sonya Dominguez, [address] [place] [address], DPT [id number]

## 2017-11-22 ENCOUNTER — HOSPITAL ENCOUNTER (OUTPATIENT)
Dept: PHYSICAL THERAPY | Age: 62
Discharge: HOME | End: 2017-11-22
Admitting: ORTHOPAEDIC SURGERY

## 2017-11-22 NOTE — FLOWSHEET NOTE
[facility],  [address]  Phone: [phone]  Fax [phone]    Physical Therapy Daily Treatment Note  Date:  2017    Patient Name:  Shavon Guerrier    :  1955  MRN: [id number]  Restrictions/Precautions:    Medical/Treatment Diagnosis Information:  · Diagnosis: Left knee pain (M25.562) s/p left knee PMM, PLM, chondroplasty DOS 11/10/17  · Treatment Diagnosis: Left knee pain (H58.831)  Insurance/Certification information:  PT Insurance Information: [id number] [employer].  Physician Information:  Referring Practitioner: Vic Claudio of care signed (Y/N):     Date of Patient follow up with Physician:     G-Code (if applicable):      Date G-Code Applied:         Progress Note: [x]  Yes  []  No  Next due by: Visit #10       Latex Allergy:  [x]NO      []YES  Preferred Language for Healthcare:   [x]English       []other:    Visit # Insurance Allowable   5 60 (11 used)     Pain level:  10     SUBJECTIVE:  Patient reports her knee is coming along. Seems to still have some swelling.      OBJECTIVE:   Observation:   Test measurements: SB rolls flexion 124 degrees    RESTRICTIONS/PRECAUTIONS:  OA,RA    Exercises/Interventions:     Therapeutic Ex Sets/sec Reps Notes            SB rolls flexion 5\" 20x    SLR flexion 2 10    Bridging  5\" 20x    SAQ 5\" 20x    Clamshells 5\" 15x    SLR abd 1 10          Incline stretch 30\" 3    Bike 5'     Minisquats 3\" 10x    DHR 2 10                Manual Intervention      HS stretch, hip flexor stretch, patellar mobs, edema massage 10'                                   NMR re-education      SLB 10\" 10x                                      Therapeutic Exercise and NMR EXR  [x] (16806) Provided verbal/tactile cueing for activities related to strengthening, flexibility, endurance, ROM for improvements in LE, proximal hip, and core control with self care, mobility, lifting, ambulation.  [] (19999) Provided verbal/tactile cueing for activities related to improving balance, coordination, kinesthetic sense, posture, motor skill, proprioception  to assist with LE, proximal hip, and core control in self care, mobility, lifting, ambulation and eccentric single leg control. NMR and Therapeutic Activities:    [] (21484 or 28470) Provided verbal/tactile cueing for activities related to improving balance, coordination, kinesthetic sense, posture, motor skill, proprioception and motor activation to allow for proper function of core, proximal hip and LE with self care and ADLs  [] (61819) Gait Re-education- Provided training and instruction to the patient for proper LE, core and proximal hip recruitment and positioning and eccentric body weight control with ambulation re-education including up and down stairs     Home Exercise Program:    [x] (45792) Reviewed/Progressed HEP activities related to strengthening, flexibility, endurance, ROM of core, proximal hip and LE for functional self-care, mobility, lifting and ambulation/stair navigation   [] (13550)Reviewed/Progressed HEP activities related to improving balance, coordination, kinesthetic sense, posture, motor skill, proprioception of core, proximal hip and LE for self care, mobility, lifting, and ambulation/stair navigation      Manual Treatments:  PROM / STM / Oscillations-Mobs:  G-I, II, III, IV (PA's, Inf., Post.)  [x] (59747) Provided manual therapy to mobilize LE, proximal hip and/or LS spine soft tissue/joints for the purpose of modulating pain, promoting relaxation,  increasing ROM, reducing/eliminating soft tissue swelling/inflammation/restriction, improving soft tissue extensibility and allowing for proper ROM for normal function with self care, mobility, lifting and ambulation.      Modalities:  HV/CP x 15' while seated     Charges:  Timed Code Treatment Minutes: 40'   Total Treatment Minutes: 54'     [] EVAL (LOW) 455 9754 (typically 20 minutes face-to-face)  [] EVAL (MOD) 67927 (typically 30 minutes face-to-face)  [] EVAL (HIGH) 77935 (typically 45 minutes face-to-face)  [] RE-EVAL     [x] AO(22508) x  2   [] IONTO  [] NMR (04877) x      [] VASO  [x] Manual (60839) x  1    [] Other:  [] TA x       [] Mech Traction (62142)  [] ES(attended) (88074)      [x] ES (un) (40042):     GOALS:   Short Term Goals: To be achieved in: 2 weeks  1. Independent in HEP and progression per patient tolerance, in order to prevent re-injury. 2. Patient will have a decrease in pain to facilitate improvement in movement, function, and ADLs as indicated by Functional Deficits.     Long Term Goals: To be achieved in: 8 weeks  1. Disability index score of 40% or less for the LEFS to assist with reaching prior level of function. 2. Patient will demonstrate increased AROM to 0 - 125 degrees to allow for proper joint functioning as indicated by patients Functional Deficits. Progress  3. Patient will demonstrate an increase in Strength in knee flexion and extension to 4+/5 to allow for proper functional mobility as indicated by patients Functional Deficits. 4. Patient will be able to ascend/descend a flight of stairs reciprocally without pain. 5. Patient will demonstrate a normal gait pattern with no AD. Progression Towards Functional goals:  [x] Patient is progressing as expected towards functional goals listed. [] Progression is slowed due to complexities listed. [] Progression has been slowed due to co-morbidities. [] Plan just implemented, too soon to assess goals progression  [] Other:     ASSESSMENT:  Patient progressing well. Gait is becoming less antalgic.       Treatment/Activity Tolerance:  [x] Patient tolerated treatment well [] Patient limited by fatique  [] Patient limited by pain  [] Patient limited by other medical complications  [] Other:     Prognosis: [] Good [x] Fair  [] Poor    Patient Requires Follow-up: [x] Yes  [] No    PLAN: See eval  [x] Continue per plan of care [] Alter current plan (see comments)  [] Plan of care initiated [] Hold pending MD visit [] Discharge    Electronically signed by: Zuhair Valero, [facility], DPT [id number]

## 2017-11-27 ENCOUNTER — HOSPITAL ENCOUNTER (OUTPATIENT)
Dept: PHYSICAL THERAPY | Age: 62
Discharge: HOME | End: 2017-11-27
Admitting: ORTHOPAEDIC SURGERY

## 2017-11-27 NOTE — FLOWSHEET NOTE
Treatment Minutes: 40'   Total Treatment Minutes: 54'     [] EVAL (LOW) 05740 (typically 20 minutes face-to-face)  [] EVAL (MOD) 36314 (typically 30 minutes face-to-face)  [] EVAL (HIGH) 79339 (typically 45 minutes face-to-face)  [] RE-EVAL     [x] IS(35650) x  2   [] IONTO  [] NMR (26726) x      [] VASO  [x] Manual (11762) x  1    [] Other:  [] TA x       [] Mech Traction (99233)  [] ES(attended) (96786)      [x] ES (un) (62958):     GOALS:   Short Term Goals: To be achieved in: 2 weeks  1. Independent in HEP and progression per patient tolerance, in order to prevent re-injury. 2. Patient will have a decrease in pain to facilitate improvement in movement, function, and ADLs as indicated by Functional Deficits.     Long Term Goals: To be achieved in: 8 weeks  1. Disability index score of 40% or less for the LEFS to assist with reaching prior level of function. 2. Patient will demonstrate increased AROM to 0 - 125 degrees to allow for proper joint functioning as indicated by patients Functional Deficits. Progress  3. Patient will demonstrate an increase in Strength in knee flexion and extension to 4+/5 to allow for proper functional mobility as indicated by patients Functional Deficits. 4. Patient will be able to ascend/descend a flight of stairs reciprocally without pain. 5. Patient will demonstrate a normal gait pattern with no AD. Progression Towards Functional goals:  [x] Patient is progressing as expected towards functional goals listed. [] Progression is slowed due to complexities listed. [] Progression has been slowed due to co-morbidities. [] Plan just implemented, too soon to assess goals progression  [] Other:     ASSESSMENT:  Patient progressing with quad strengthening. Continues to demo edema.        Treatment/Activity Tolerance:  [x] Patient tolerated treatment well [] Patient limited by fatique  [] Patient limited by pain  [] Patient limited by other medical complications  [] Other:     Prognosis: [] Good [x] Fair  [] Poor    Patient Requires Follow-up: [x] Yes  [] No    PLAN: See eval  [x] Continue per plan of care [] Alter current plan (see comments)  [] Plan of care initiated [] Hold pending MD visit [] Discharge    Electronically signed by: Lisy Green, PT, DPT [id number]

## 2017-11-30 ENCOUNTER — HOSPITAL ENCOUNTER (OUTPATIENT)
Dept: PHYSICAL THERAPY | Age: 62
Discharge: HOME | End: 2017-11-30
Admitting: ORTHOPAEDIC SURGERY

## 2017-11-30 NOTE — FLOWSHEET NOTE
[facility],  [address] [place]  Phone: [phone]  Fax [phone]    Physical Therapy Daily Treatment Note  Date:  2017    Patient Name:  Zachery Keys    :  1955  MRN: [id number]  Restrictions/Precautions:    Medical/Treatment Diagnosis Information:  · Diagnosis: Left knee pain (M25.562) s/p left knee PMM, PLM, chondroplasty DOS 11/10/17  · Treatment Diagnosis: Left knee pain (T41.671)  Insurance/Certification information:  PT Insurance Information: Silvia Owens  Physician Information:  Referring Practitioner: Lo Bailey signed (Y/N):     Date of Patient follow up with Physician:     G-Code (if applicable):      Date G-Code Applied:         Progress Note: [x]  Yes  []  No  Next due by: Visit #10       Latex Allergy:  [x]NO      []YES  Preferred Language for Healthcare:   [x]English       []other:    Visit # Insurance Allowable   7 60 (11 used)     Pain level:  2/10     SUBJECTIVE:  Patient reports her knee still gets swollen at times, but overall is doing better.      OBJECTIVE:   Observation:   Test measurements: NT this date    RESTRICTIONS/PRECAUTIONS:  OA,RA    Exercises/Interventions:     Therapeutic Ex Sets/sec Reps Notes            SB rolls flexion 5\" 20x    SLR flexion 2 10    Bridging  5\" 20x    SAQ wth ADD 5\" 20x 1#   Clamshells 5\" 20x    SLR abd 2 10          Incline stretch 30\" 3    Bike 5'        DHR 2 10    FSU 2 10 6\"   ATC      Manual Intervention      HS stretch, hip flexor stretch, patellar mobs, edema massage 10'                                   NMR re-education      SLB 10\" 10x                                      Therapeutic Exercise and NMR EXR  [x] (81600) Provided verbal/tactile cueing for activities related to strengthening, flexibility, endurance, ROM for improvements in LE, proximal hip, and core control with self care, mobility, lifting, ambulation.  [] (40986) Provided verbal/tactile cueing for activities related to improving balance, coordination, kinesthetic sense, posture, motor skill, proprioception  to assist with LE, proximal hip, and core control in self care, mobility, lifting, ambulation and eccentric single leg control. NMR and Therapeutic Activities:    [] (96514 or 53388) Provided verbal/tactile cueing for activities related to improving balance, coordination, kinesthetic sense, posture, motor skill, proprioception and motor activation to allow for proper function of core, proximal hip and LE with self care and ADLs  [] (81053) Gait Re-education- Provided training and instruction to the patient for proper LE, core and proximal hip recruitment and positioning and eccentric body weight control with ambulation re-education including up and down stairs     Home Exercise Program:    [x] (02767) Reviewed/Progressed HEP activities related to strengthening, flexibility, endurance, ROM of core, proximal hip and LE for functional self-care, mobility, lifting and ambulation/stair navigation   [] (37564)Reviewed/Progressed HEP activities related to improving balance, coordination, kinesthetic sense, posture, motor skill, proprioception of core, proximal hip and LE for self care, mobility, lifting, and ambulation/stair navigation      Manual Treatments:  PROM / STM / Oscillations-Mobs:  G-I, II, III, IV (PA's, Inf., Post.)  [x] (56987) Provided manual therapy to mobilize LE, proximal hip and/or LS spine soft tissue/joints for the purpose of modulating pain, promoting relaxation,  increasing ROM, reducing/eliminating soft tissue swelling/inflammation/restriction, improving soft tissue extensibility and allowing for proper ROM for normal function with self care, mobility, lifting and ambulation.      Modalities:  HV/CP x 15' while seated     Charges:  Timed Code Treatment Minutes: 40'   Total Treatment Minutes: 54'     [] EVAL (LOW) 455 1011 (typically 20 minutes face-to-face)  [] EVAL (MOD) 95663 (typically 30 minutes face-to-face)  [] EVAL (HIGH) 12752 (typically 45 minutes face-to-face)  [] RE-EVAL     [x] LP(10213) x  2   [] IONTO  [] NMR (17760) x      [] VASO  [x] Manual (05114) x  1    [] Other:  [] TA x       [] Mech Traction (30987)  [] ES(attended) (60585)      [x] ES (un) (96741):     GOALS:   Short Term Goals: To be achieved in: 2 weeks  1. Independent in HEP and progression per patient tolerance, in order to prevent re-injury. 2. Patient will have a decrease in pain to facilitate improvement in movement, function, and ADLs as indicated by Functional Deficits.     Long Term Goals: To be achieved in: 8 weeks  1. Disability index score of 40% or less for the LEFS to assist with reaching prior level of function. 2. Patient will demonstrate increased AROM to 0 - 125 degrees to allow for proper joint functioning as indicated by patients Functional Deficits. Progress  3. Patient will demonstrate an increase in Strength in knee flexion and extension to 4+/5 to allow for proper functional mobility as indicated by patients Functional Deficits. 4. Patient will be able to ascend/descend a flight of stairs reciprocally without pain. 5. Patient will demonstrate a normal gait pattern with no AD. Progression Towards Functional goals:  [x] Patient is progressing as expected towards functional goals listed. [] Progression is slowed due to complexities listed. [] Progression has been slowed due to co-morbidities. [] Plan just implemented, too soon to assess goals progression  [] Other:     ASSESSMENT:  Patient with some tightness in medial hamstring. Tolerated weights with ATC well.      Treatment/Activity Tolerance:  [x] Patient tolerated treatment well [] Patient limited by fatique  [] Patient limited by pain  [] Patient limited by other medical complications  [] Other:     Prognosis: [] Good [x] Fair  [] Poor    Patient Requires Follow-up: [x] Yes  [] No    PLAN: See

## 2017-11-30 NOTE — FLOWSHEET NOTE
Polo Bilat. 30# 2x10                              Ecc.                               Inv.        Soleus Press Bilat. Ecc.                           Inv.                             Ladders                Square               Jump/Hop  Low                      Med.                      High                                                            Modality ES/CP 15'   Initials                             KRT

## 2017-12-01 ENCOUNTER — HOSPITAL ENCOUNTER (OUTPATIENT)
Dept: PHYSICAL THERAPY | Age: 62
Discharge: HOME | End: 2017-12-31
Attending: ORTHOPAEDIC SURGERY | Admitting: ORTHOPAEDIC SURGERY

## 2017-12-04 ENCOUNTER — HOSPITAL ENCOUNTER (OUTPATIENT)
Dept: PHYSICAL THERAPY | Age: 62
Discharge: HOME | End: 2017-12-04
Admitting: ORTHOPAEDIC SURGERY

## 2017-12-04 NOTE — FLOWSHEET NOTE
[facility],  [address] [place]  Phone: [phone]  Fax [phone]    Physical Therapy Daily Treatment Note  Date:  2017    Patient Name:  Mendoza Ramsey    :  1955  MRN: [id number]  Restrictions/Precautions:    Medical/Treatment Diagnosis Information:  · Diagnosis: Left knee pain (M25.562) s/p left knee PMM, PLM, chondroplasty DOS 11/10/17  · Treatment Diagnosis: Left knee pain (S96.334)  Insurance/Certification information:  PT Insurance Information: [id number] L[employer]ultana [employer].  Physician Information:  Referring Practitioner: Mariana Sandoval of care signed (Y/N):     Date of Patient follow up with Physician:     G-Code (if applicable):      Date G-Code Applied:         Progress Note: [x]  Yes  []  No  Next due by: Visit #10       Latex Allergy:  [x]NO      []YES  Preferred Language for Healthcare:   [x]English       []other:    Visit # Insurance Allowable   8 60 (11 used)     Pain level:  2/10     SUBJECTIVE:  Patient reports her knee felt great all weekend. This morning she was standing for a while wrapping presents and it started to really hurt.       OBJECTIVE:   Observation:   Test measurements: NT this date    RESTRICTIONS/PRECAUTIONS:  OA,RA    Exercises/Interventions:     Therapeutic Ex Sets/sec Reps Notes            SB rolls flexion 5\" 20x    SLR flexion 2 10    Bridging  5\" 20x    SAQ wth ADD 5\" 20x 1#   Clamshells 5\" 20x    SLR abd 2 10          Incline stretch 30\" 3    Bike 5'           FSU 1 15 6\"   FSU an francisco j 1 10 4\"   ATC      Manual Intervention      HS stretch, hip flexor stretch, patellar mobs, edema massage 10'                                   NMR re-education      SLB 10\" 10x                                      Therapeutic Exercise and NMR EXR  [x] (30334) Provided verbal/tactile cueing for activities related to strengthening, flexibility, endurance, ROM for improvements in LE, proximal hip, and core control with self care, mobility, lifting, ambulation.  [] (34958) Provided verbal/tactile cueing for activities related to improving balance, coordination, kinesthetic sense, posture, motor skill, proprioception  to assist with LE, proximal hip, and core control in self care, mobility, lifting, ambulation and eccentric single leg control. NMR and Therapeutic Activities:    [] (75250 or 00742) Provided verbal/tactile cueing for activities related to improving balance, coordination, kinesthetic sense, posture, motor skill, proprioception and motor activation to allow for proper function of core, proximal hip and LE with self care and ADLs  [] (10446) Gait Re-education- Provided training and instruction to the patient for proper LE, core and proximal hip recruitment and positioning and eccentric body weight control with ambulation re-education including up and down stairs     Home Exercise Program:    [x] (86412) Reviewed/Progressed HEP activities related to strengthening, flexibility, endurance, ROM of core, proximal hip and LE for functional self-care, mobility, lifting and ambulation/stair navigation   [] (69392)Reviewed/Progressed HEP activities related to improving balance, coordination, kinesthetic sense, posture, motor skill, proprioception of core, proximal hip and LE for self care, mobility, lifting, and ambulation/stair navigation      Manual Treatments:  PROM / STM / Oscillations-Mobs:  G-I, II, III, IV (PA's, Inf., Post.)  [x] (45242) Provided manual therapy to mobilize LE, proximal hip and/or LS spine soft tissue/joints for the purpose of modulating pain, promoting relaxation,  increasing ROM, reducing/eliminating soft tissue swelling/inflammation/restriction, improving soft tissue extensibility and allowing for proper ROM for normal function with self care, mobility, lifting and ambulation.      Modalities:  HV/CP x 15' while seated     Charges:  Timed Code Treatment Minutes: 36'   Total Treatment Minutes:

## 2017-12-04 NOTE — FLOWSHEET NOTE
[facility], [address] [place]  Phone: [phone]  Fax [phone]      ATHLETIC TRAINING [address]  Date:  2017    Patient Name:  Katharina Castorena    :  1955  MRN: [id number]  Restrictions/Precautions:  OA, RA  Medical/Treatment Diagnosis Information:  ·  Diagnosis: Left knee pain (M25.562) s/p left knee PMM, PLM, chondroplasty DOS 11/10/17  ·  Treatment diagnosis: left knee pain (M25.562)  Physician Information:   Referring Practitioner: Rupesh Pope Post-op  8 wks  12 wks 16 wks 20 wks   24 wks                            Activity Log                                                  DOS/DOI:                                                    Date:  17   ATC communication:  lidia grandkids Pt reported fatigue with exercise General soreness quad & post knee  Gr 2-3 swelling dec w/ [facility]   Bike     Elliptical     Treadmill     Airdyne          Gastroc stretch     Soleus stretch     Hamstring stretch     ITB stretch     Hip Flexor stretch     Quad stretch     Adductor stretch          Weight Shifting sp                               fp                               tp     Lateral walking (with/w/o TB)          Balance: PEP/Olya board                    SLS w/PT W/ PT         Star excursion load/explode           Extremity reach UE/LE          Leg Press Hernan. 80# 2x10 80# 2x10                     Ecc.                       Inv. Calf Press Hernan.  80# 2x10 60# 2x10 (stretch focus today)                      Ecc.                         Inv.          SINTIA   Flex                ABd 45# R/L 2x10 45# R/L 2x15              ADd               TKE 60# 20x5\" 60# 20x5\"              Ext          Steps Up w/PT W/ PT              Up and Over                Down                Lateral                Rotation          Squats  mini                   wall                  BOSU           Lunges:

## 2017-12-06 ENCOUNTER — HOSPITAL ENCOUNTER (OUTPATIENT)
Dept: PHYSICAL THERAPY | Age: 62
Discharge: HOME | End: 2017-12-06
Admitting: ORTHOPAEDIC SURGERY

## 2017-12-06 NOTE — FLOWSHEET NOTE
[facility],  [address] [place]  Phone: [phone]  Fax [phone]    Physical Therapy Daily Treatment Note  Date:  2017    Patient Name:  Akila Riojas    :  1955  MRN: [id number]  Restrictions/Precautions:    Medical/Treatment Diagnosis Information:  · Diagnosis: Left knee pain (M25.562) s/p left knee PMM, PLM, chondroplasty DOS 11/10/17  · Treatment Diagnosis: Left knee pain (P79.146)  Insurance/Certification information:  PT Insurance Information: [id number] L[employer]ultana [employer].  Physician Information:  Referring Practitioner: Suha Sorensen of care signed (Y/N): Yes    Date of Patient follow up with Physician:     G-Code (if applicable):      Date G-Code Applied:         Progress Note: [x]  Yes  []  No  Next due by: Visit #10       Latex Allergy:  [x]NO      []YES  Preferred Language for Healthcare:   [x]English       []other:    Visit # Insurance Allowable   9 60 (11 used)     Pain level:  0-1/10     SUBJECTIVE:  Patient reports her knee has been doing well since last visit. Not having much trouble, even with stairs.      OBJECTIVE:   Observation:   Test measurements: NT this date    RESTRICTIONS/PRECAUTIONS:  OA,RA    Exercises/Interventions:     Therapeutic Ex Sets/sec Reps Notes            SB rolls flexion 5\" 15x    SLR flexion 2 10 1#   SB Bridging  5\" 20x    SAQ with ADD 5\" 30x 1#   Clamshells 5\" 20x Add resistance NV   SLR abd  25 x          Incline stretch 30\" 3    Bike 5'           FSU and over 1 15 4\"   ATC      Manual Intervention      HS stretch, hip flexor stretch, patellar mobs, edema massage 10'                                   NMR re-education      SLB 10\" 10x                                      Therapeutic Exercise and NMR EXR  [x] (46001) Provided verbal/tactile cueing for activities related to strengthening, flexibility, endurance, ROM for improvements in LE, proximal hip, and core control with self care, mobility, lifting, ambulation.  [] (82049) Provided verbal/tactile cueing for activities related to improving balance, coordination, kinesthetic sense, posture, motor skill, proprioception  to assist with LE, proximal hip, and core control in self care, mobility, lifting, ambulation and eccentric single leg control. NMR and Therapeutic Activities:    [] (73337 or 79025) Provided verbal/tactile cueing for activities related to improving balance, coordination, kinesthetic sense, posture, motor skill, proprioception and motor activation to allow for proper function of core, proximal hip and LE with self care and ADLs  [] (95861) Gait Re-education- Provided training and instruction to the patient for proper LE, core and proximal hip recruitment and positioning and eccentric body weight control with ambulation re-education including up and down stairs     Home Exercise Program:    [x] (14109) Reviewed/Progressed HEP activities related to strengthening, flexibility, endurance, ROM of core, proximal hip and LE for functional self-care, mobility, lifting and ambulation/stair navigation   [] (53942)Reviewed/Progressed HEP activities related to improving balance, coordination, kinesthetic sense, posture, motor skill, proprioception of core, proximal hip and LE for self care, mobility, lifting, and ambulation/stair navigation      Manual Treatments:  PROM / STM / Oscillations-Mobs:  G-I, II, III, IV (PA's, Inf., Post.)  [x] (65371) Provided manual therapy to mobilize LE, proximal hip and/or LS spine soft tissue/joints for the purpose of modulating pain, promoting relaxation,  increasing ROM, reducing/eliminating soft tissue swelling/inflammation/restriction, improving soft tissue extensibility and allowing for proper ROM for normal function with self care, mobility, lifting and ambulation.      Modalities:  HV/CP x 15' while seated     Charges:  Timed Code Treatment Minutes: 40'   Total Treatment Minutes: 54'     [] ANEL Poor    Patient Requires Follow-up: [x] Yes  [] No    PLAN: See eval  [x] Continue per plan of care [] Alter current plan (see comments)  [] Plan of care initiated [] Hold pending MD visit [] Discharge    Electronically signed by: Trudy Harvey, [address] [place] [address], DPT [id number]

## 2017-12-06 NOTE — FLOWSHEET NOTE
Lateral                 Rotation            Squats  mini                    wall                   BOSU             Lunges:  Lunge to Balance                     Balance to Lunge                     Walking            Knee Extension Bilat. Ecc.                                 Inv. Hamstring Curls Bilat. 30# 2x10 30# 2x10 30# 2x12                              Ecc.                                 Inv.            Soleus Press Bilat. Ecc.                             Inv.                                   Ladders                  Square                 Jump/Hop  Low                        Med.                        High                                                                  Modality ES/CP 15' ES/CP 15' ES/CP 15'   Initials                             KRT DB DB

## 2017-12-11 ENCOUNTER — HOSPITAL ENCOUNTER (OUTPATIENT)
Dept: PHYSICAL THERAPY | Age: 62
Discharge: HOME | End: 2017-12-11
Admitting: ORTHOPAEDIC SURGERY

## 2017-12-11 NOTE — FLOWSHEET NOTE
[facility], [address] [facility] [place]  Phone: [phone]  Fax [phone]      ATHLETIC TRAINING [address]  Date:  2017    Patient Name:  Davion Nelson    :  1955  MRN: [id number]  Restrictions/Precautions:  OA, RA  Medical/Treatment Diagnosis Information:  ·  Diagnosis: Left knee pain (M25.562) s/p left knee PMM, PLM, chondroplasty DOS 11/10/17  ·  Treatment diagnosis: left knee pain (M25.562)  Physician Information:   Referring Practitioner: Chester Garcia Post-op  8 wks  12 wks 16 wks 20 wks   24 wks                            Activity Log                                                  DOS/DOI:                                                    Date:  17   ATC communication:  lidia haynes Pt reported fatigue with exercise General soreness quad & post knee  Gr 2-3 swelling dec w/ [address] No gym access but has total gym at home. Has a little shopping after PT. Bike       Elliptical       Treadmill       Airdyne              Gastroc stretch       Soleus stretch       Hamstring stretch       ITB stretch       Hip Flexor stretch       Quad stretch       Adductor stretch              Weight Shifting sp                                 fp                                 tp       Lateral walking (with/w/o TB)              Balance: PEP/Olya board                      SLS w/PT W/ PT           Star excursion load/explode             Extremity reach UE/LE              Leg Press Hernan. 80# 2x10 80# 2x10 80# 2x12 80# 2x15                     Ecc.    NV                     Inv. Calf Press Hernan.  80# 2x10 60# 2x10 (stretch focus today) 60# 2x12 60# 2x15                      Ecc.                           Inv.              SINTIA   Flex                  ABd 45# R/L 2x10 45# R/L 2x15 45# R/L 3x12 60# R/L 2x10              ADd                 TKE 60# 20x5\" 60# 20x5\" 60# 20x5\" 60# 25x5\"              Ext   NV 45# R/L 2x10          Steps Up w/PT W/ PT                Up and Over                  Down                  Lateral                  Rotation              Squats  mini    2HH x10                 wall                    BOSU               Lunges:  Lunge to Balance                      Balance to Lunge                      Walking              Knee Extension Bilat. Ecc.                                  Inv. Hamstring Curls Bilat. 30# 2x10 30# 2x10 30# 2x12 30# 2x15                              Ecc.                                  Inv.              Soleus Press Bilat. Ecc.                              Inv.                                      Ladders                   Square                  Jump/Hop  Low                         Med.                         High                                                                     Modality ES/CP 15' ES/CP 15' ES/CP 15' ES/CP 15'   Initials                             KRT DB DB DB

## 2017-12-11 NOTE — FLOWSHEET NOTE
[facility],  [address]  Phone: [phone]  Fax [phone]    Physical Therapy Daily Treatment Note  Date:  2017    Patient Name:  Nishi Thibodeaux    :  1955  MRN: [id number]  Restrictions/Precautions:    Medical/Treatment Diagnosis Information:  · Diagnosis: Left knee pain (M25.562) s/p left knee PMM, PLM, chondroplasty DOS 11/10/17  · Treatment Diagnosis: Left knee pain (V14.030)  Insurance/Certification information:  PT Insurance Information: [id number] SWET[employer] [facility].  Physician Information:  Referring Practitioner: Arpit Avilez of care signed (Y/N): Yes    Date of Patient follow up with Physician:     G-Code (if applicable):      Date G-Code Applied:         Progress Note: [x]  Yes  []  No  Next due by: Visit #10       Latex Allergy:  [x]NO      []YES  Preferred Language for Healthcare:   [x]English       []other:    Visit # Insurance Allowable   82 16 (11 used)     Pain level:  0-1/10     SUBJECTIVE:  Patient reports her knee is doing well. Minimal pain.  Biggest complaint is stiffness and swelling.        OBJECTIVE:   Observation:   Test measurements: See updated POC    RESTRICTIONS/PRECAUTIONS:  OA,RA    Exercises/Interventions:     Therapeutic Ex Sets/sec Reps Notes            SB rolls flexion 5\" 15x    SLR flexion 2 10 1#   SB Bridging  5\" 20x    SAQ with ADD 5\" 20x 2#   Clamshells 5\" 20x OVL   SLR abd 2 10 1# first set         Incline stretch 30\" 3    Bike 5'           FSU and over 2 10 4\" (attempted 6\", but painful)   ATC      Manual Intervention      HS stretch, hip flexor stretch, patellar mobs, edema massage 10'                                   NMR re-education      SLB airex 10\" 10x                                      Therapeutic Exercise and NMR EXR  [x] (25282) Provided verbal/tactile cueing for activities related to strengthening, flexibility, endurance, ROM for improvements in LE, proximal hip, and

## 2017-12-11 NOTE — PLAN OF CARE
[facility], [address] [place]  Phone: [phone]  Fax [phone]     Physical Therapy Re-Certification Plan of Care    Dear Dr. Dominique Coe  ,    We had the pleasure of treating the following patient for physical therapy services at [address] [place] [address]. A summary of our findings can be found in the updated assessment below. This includes our plan of care. If you have any questions or concerns regarding these findings, please do not hesitate to contact me at the office phone number checked above. Thank you for the referral.     Physician Signature:________________________________Date:__________________  By signing above, therapists plan is approved by physician      Patient: Zachery Keys   : 1955   MRN: [id number]  Referring Physician:        Evaluation Date: 2017      Medical Diagnosis Information:  ·  Diagnosis: Left knee pain (M25.562) s/p left knee PMM, PLM, chondroplasty DOS 11/10/17  ·  Treatment Diagnosis: Left knee pain (M25.562)  Insurance information:   PT Insurance Information: Silvia Owens    Date Range:17 - 17  Total visits: 10      G-Codes: (if applicable) PT G-Codes  Functional Assessment Tool Used: LEFS  Score: 31%  Functional Limitation: Mobility: Walking and moving around  Mobility: Walking and Moving Around Current Status (): At least 20 percent but less than 40 percent impaired, limited or restricted  Mobility: Walking and Moving Around Goal Status (): At least 40 percent but less than 60 percent impaired, limited or restricted   Functional Index used:    SUBJECTIVE: Patient reports her knee is doing well. Minimal pain. Biggest complaint is stiffness and swelling.      Current Pain Scale: 0-1/10    Type: []Constant   [x]Intermitment  []Radiating []Localized  []other:     Functional Limitations: []Sitting []Standing []Walking    [x]Squatting [x]Stairs

## 2017-12-13 ENCOUNTER — HOSPITAL ENCOUNTER (OUTPATIENT)
Dept: PHYSICAL THERAPY | Age: 62
Discharge: HOME | End: 2017-12-13
Admitting: ORTHOPAEDIC SURGERY

## 2017-12-13 NOTE — FLOWSHEET NOTE
care, mobility, lifting, ambulation.  [] (13746) Provided verbal/tactile cueing for activities related to improving balance, coordination, kinesthetic sense, posture, motor skill, proprioception  to assist with LE, proximal hip, and core control in self care, mobility, lifting, ambulation and eccentric single leg control. NMR and Therapeutic Activities:    [] (13533 or 81580) Provided verbal/tactile cueing for activities related to improving balance, coordination, kinesthetic sense, posture, motor skill, proprioception and motor activation to allow for proper function of core, proximal hip and LE with self care and ADLs  [] (56180) Gait Re-education- Provided training and instruction to the patient for proper LE, core and proximal hip recruitment and positioning and eccentric body weight control with ambulation re-education including up and down stairs     Home Exercise Program:    [x] (36617) Reviewed/Progressed HEP activities related to strengthening, flexibility, endurance, ROM of core, proximal hip and LE for functional self-care, mobility, lifting and ambulation/stair navigation   [] (13745)Reviewed/Progressed HEP activities related to improving balance, coordination, kinesthetic sense, posture, motor skill, proprioception of core, proximal hip and LE for self care, mobility, lifting, and ambulation/stair navigation      Manual Treatments:  PROM / STM / Oscillations-Mobs:  G-I, II, III, IV (PA's, Inf., Post.)  [x] (51421) Provided manual therapy to mobilize LE, proximal hip and/or LS spine soft tissue/joints for the purpose of modulating pain, promoting relaxation,  increasing ROM, reducing/eliminating soft tissue swelling/inflammation/restriction, improving soft tissue extensibility and allowing for proper ROM for normal function with self care, mobility, lifting and ambulation.      Modalities:  HV/CP x 15' while seated     Charges:  Timed Code Treatment Minutes: 36'   Total Treatment Minutes: 55     [] EVAL (LOW) 82972 (typically 20 minutes face-to-face)  [] EVAL (MOD) 67690 (typically 30 minutes face-to-face)  [] EVAL (HIGH) 59066 (typically 45 minutes face-to-face)  [] RE-EVAL     [x] VW(32480) x  2   [] IONTO  [] NMR (01325) x      [] VASO  [x] Manual (14827) x  1    [] Other:  [] TA x       [] Mech Traction (60212)  [] ES(attended) (43755)      [x] ES (un) (79465):     GOALS:   Short Term Goals: To be achieved in: 2 weeks  1. Independent in HEP and progression per patient tolerance, in order to prevent re-injury. Met  2. Patient will have a decrease in pain to facilitate improvement in movement, function, and ADLs as indicated by Functional Deficits. Met     Long Term Goals: To be achieved in: 8 weeks  1. Disability index score of 40% or less for the LEFS to assist with reaching prior level of function. Met  2. Patient will demonstrate increased AROM to 0 - 125 degrees to allow for proper joint functioning as indicated by patients Functional Deficits. Met  3. Patient will demonstrate an increase in Strength in knee flexion and extension to 4+/5 to allow for proper functional mobility as indicated by patients Functional Deficits. Met  4. Patient will be able to ascend/descend a flight of stairs reciprocally without pain. Met  5. Patient will demonstrate a normal gait pattern with no AD. Met              Progression Towards Functional goals:  [x] Patient is progressing as expected towards functional goals listed. [] Progression is slowed due to complexities listed. [] Progression has been slowed due to co-morbidities.   [] Plan just implemented, too soon to assess goals progression  [] Other:     ASSESSMENT:  See DC note    Treatment/Activity Tolerance:  [x] Patient tolerated treatment well [] Patient limited by fatique  [] Patient limited by pain  [] Patient limited by other medical complications  [] Other:     Prognosis: [] Good [x] Fair  [] Poor    Patient Requires Follow-up: [x] Yes  [] No    PLAN: See

## 2017-12-13 NOTE — DISCHARGE SUMMARY
[facility],  [address] Javier Ramirez  Phone: [phone]  Fax [phone]       Physical Therapy Discharge  Date: 2017        Patient Name:  Deejay Connolly    :  1955  MRN: [id number]  Referring Physician: Carly Lowry  Diagnosis:Left knee pain s/p left PMM, PLM, chondroplasty                        ICD Code:M25.562  [x] Surgical [] Conservative  Therapy Diagnosis/Practice Pattern:I      Number of Comorbidities:  []0     [x]1-2    []3+  Total number of visits: 11   Reporting Period:   Beginning Date:17   End Date:17    OBJECTIVE  Test used Initial score Discharge Score   Pain Summary 0-10 2 0-1   Functional questionnaire LEFS 84% 31%   Functional Testing            ROM Left knee 7 - 65 0-125         Strength Left knee flexion DNT 4+/5    Left knee extension DNT 4+/5        Functional Limitation G-Code (if applicable):         PT G-Codes  Functional Assessment Tool Used: LEFS  Score: 31%  Functional Limitation: Mobility: Walking and moving around  Mobility: Walking and Moving Around Current Status (): At least 20 percent but less than 40 percent impaired, limited or restricted  Mobility: Walking and Moving Around Goal Status (): At least 20 percent but less than 40 percent impaired, limited or restricted  Mobility: Walking and Moving Around Discharge Status ():  At least 20 percent but less than 40 percent impaired, limited or restricted   Test/tests used to determine % limitation:  Actual Score used to drive % limitation:    Treatment to date:  [x] Therapeutic Exercise    [x] Modalities:  [] Therapeutic Activity             []Ultrasound            [x]Electrical Stimulation  [] Gait Training     []Cervical Traction    [] Lumbar Traction  [] Neuromuscular Re-education [x] Cold/hotpack         []Iontophoresis  [x] Instruction in HEP      Other:  [x] Manual Therapy                   [] ?           []   Assessment:  [x] All Goals were achieved. [] The following goals were achieved (#'s):  [] The following goals were not achieved for the following reasons:  Summary/assessmen of improvement as it relates to each goal:    Plan of Care:  [x] Discharge from Therapy Services due to:    Reason for Discharge:   [x] All goals achieved    [] Patient having surgery  [] Physician discontinued therapy  [] Insurance/Financial Limitations [] Patient did not return for follow up visits [] Home program/1 visit only   [] No subjective or objective improvement [] Plateaued   [] Patient was unable to adhere to the plan of care established at evaluation. [] Referred back to physician for re-evaluation and did not return.      [] Other:?       Electronically signed by:  Francia Millan, [address] [facility], DPT [id number]

## 2017-12-15 ENCOUNTER — OFFICE VISIT (OUTPATIENT)
Dept: ORTHOPEDIC SURGERY | Age: 62
End: 2017-12-15

## 2017-12-15 VITALS — WEIGHT: 165 LBS | HEIGHT: 68 IN | BODY MASS INDEX: 25.01 KG/M2

## 2017-12-15 DIAGNOSIS — S83.232D COMPLEX TEAR OF MEDIAL MENISCUS OF LEFT KNEE AS CURRENT INJURY, SUBSEQUENT ENCOUNTER: Primary | ICD-10-CM

## 2017-12-15 DIAGNOSIS — S83.272D COMPLEX TEAR OF LATERAL MENISCUS OF LEFT KNEE AS CURRENT INJURY, SUBSEQUENT ENCOUNTER: ICD-10-CM

## 2017-12-15 DIAGNOSIS — M94.262 CHONDROMALACIA OF LEFT KNEE: ICD-10-CM

## 2017-12-15 PROCEDURE — 99024 POSTOP FOLLOW-UP VISIT: CPT | Performed by: PHYSICIAN ASSISTANT

## 2018-01-01 ENCOUNTER — HOSPITAL ENCOUNTER (OUTPATIENT)
Dept: PHYSICAL THERAPY | Age: 63
Discharge: HOME | End: 2018-01-31
Attending: ORTHOPAEDIC SURGERY | Admitting: ORTHOPAEDIC SURGERY

## 2018-02-21 ENCOUNTER — NURSE ONLY (OUTPATIENT)
Dept: ORTHOPEDIC SURGERY | Age: 63
End: 2018-02-21

## 2018-02-21 DIAGNOSIS — M94.269 CHONDROMALACIA OF KNEE, UNSPECIFIED LATERALITY: Primary | ICD-10-CM

## 2018-02-21 PROCEDURE — 20611 DRAIN/INJ JOINT/BURSA W/US: CPT | Performed by: PHYSICIAN ASSISTANT

## 2018-02-21 NOTE — PROGRESS NOTES
Diagnosis: Left and right knee osteoarthritis/rheumatoid arthritis. Procedure: Left and right knee cortisone injection    I discussed in detail the risks, benefits and complications of aninjection which included but are not limited to infection, skin reactions, hot swollen joint, and anaphylaxis with the patient. The patient verbalized understanding and gave informed consent for the left and right knee injection. The patient's knee was flexed to 90° and the skin prepped using sterile alcohol solution. A sterile 22-gauge needle was inserted into the knee and the mixture of 2ml Beta-Beta, 3 mL of 0.25% Marcaine was injected under sterile technique. The needle was withdrawn and the puncture site sealed with a Band-Aid. Technique: Under sterile conditions a [employer] ultrasound unit with a variable frequency (6.0-15.0 MHz) linear transducer was used to localize the placement of a 22-gauge needle into the knee joint. Findings: Successful needle placement for knee injection. Final images were taken and saved for permanent record. The patient tolerated the injection well. The patient was instructed to call the office immediately if there is any pain, redness, warmth, fever, or chills.

## 2018-03-21 ENCOUNTER — OFFICE VISIT (OUTPATIENT)
Dept: ORTHOPEDIC SURGERY | Age: 63
End: 2018-03-21

## 2018-03-21 VITALS
HEART RATE: 90 BPM | HEIGHT: 68 IN | SYSTOLIC BLOOD PRESSURE: 107 MMHG | WEIGHT: 164.9 LBS | BODY MASS INDEX: 24.99 KG/M2 | DIASTOLIC BLOOD PRESSURE: 67 MMHG

## 2018-03-21 DIAGNOSIS — M25.572 CHRONIC ANKLE PAIN, BILATERAL: Primary | ICD-10-CM

## 2018-03-21 DIAGNOSIS — M25.571 CHRONIC ANKLE PAIN, BILATERAL: Primary | ICD-10-CM

## 2018-03-21 DIAGNOSIS — G89.29 CHRONIC ANKLE PAIN, BILATERAL: Primary | ICD-10-CM

## 2018-03-21 PROCEDURE — 99214 OFFICE O/P EST MOD 30 MIN: CPT | Performed by: ORTHOPAEDIC SURGERY

## 2018-03-21 NOTE — PROGRESS NOTES
Chief Complaint    Pain (Bilateral ankle pain , says it is arthritis and right ankle is worse than left. The ankle brace OTC she got a week ago it really helped her alot. No Injury.)      History of Present Illness: Mario Alberto Donato is a 58 y.o. female who is here for evaluation chief complaint of right greater than left ankle pain. She has rheumatoid arthritis with a positive rheumatoid factor and her arthritis affects her knees and ankles and hands. Rates her pain at 4 out of 10. States the right ankle pain started about a year ago without history of trauma. Standing makes it worse sitting makes it better she does have exacerbations and remissions. Out on a regular basis retired. She states she got braces for both her knees and her ankles invasive made a huge difference    Medical History:  Patient's medications, allergies, past medical, surgical, social and family histories were reviewed and updated as appropriate. Review of Systems:  Pertinent items are noted in HPI  Review of systems reviewed from Patient History Form dated on March 21, 2018 and available in the patient's chart under the Media tab. Vital Signs:  /67 (Site: Left Arm, Position: Sitting)   Pulse 90   Ht 5' 7.72\" (1.72 m)   Wt 164 lb 14.5 oz (74.8 kg)   BMI 25.28 kg/m²     General Exam:   Constitutional: Patient is adequately groomed with no evidence of malnutrition  DTRs: Deep tendon reflexes are intact  Mental Status: The patient is oriented to time, place and person. The patient's mood and affect are appropriate.     Foot Examination:    Inspection:  Mild effusion right greater than left ankle    Palpation:  Tenderness all throughout the right ankle    Range of Motion:  10° of dorsiflexion and 40° of plantarflexion bilaterally    Strength:  Generally 4+ over 5 throughout with no focal weakness    Special Tests:  Anterior drawer and talar tilt showed no gross laxity    Skin: There are no rashes, ulcerations or lesions. Gait: Mildly antalgic without assistive device    Reflex 2+ and symmetric    Additional Comments:       Additional Examinations:         Contralateral Exam: Both lower extremities are involved right greater than left    Radiology:     X-rays obtained and reviewed in office:  Views 3  Location both ankles  Impression show mild insertional Achilles spurring and minimal change in the ankle joint itself          Assessment :  Bilateral ankle arthritis    Impression:  Encounter Diagnosis   Name Primary?  Chronic ankle pain, bilateral Yes       Office Procedures:  Orders Placed This Encounter   Procedures    XR ANKLE RIGHT (MIN 3 VIEWS)    XR ANKLE LEFT (MIN 3 VIEWS)       Treatment Plan:  I spent 30 minutes with this patient greater than 50% of the time face-to-face discussing her treatment options. I reviewed everything out on her plan of care and right now she'll treat this symptomatically. She has been using braces with excellent relief is on prednisone and if she has pain beyond this with an exacerbation I would recommend we inject her.   All questions were answered no guarantees were given or implied

## 2018-10-17 ENCOUNTER — NURSE ONLY (OUTPATIENT)
Dept: ORTHOPEDIC SURGERY | Age: 63
End: 2018-10-17
Payer: COMMERCIAL

## 2018-10-17 DIAGNOSIS — M17.0 PRIMARY OSTEOARTHRITIS OF BOTH KNEES: Primary | ICD-10-CM

## 2019-05-08 ENCOUNTER — OFFICE VISIT (OUTPATIENT)
Dept: ORTHOPEDIC SURGERY | Age: 64
End: 2019-05-08
Payer: COMMERCIAL

## 2019-05-08 VITALS — WEIGHT: 157 LBS | BODY MASS INDEX: 23.79 KG/M2 | HEIGHT: 68 IN

## 2019-05-08 DIAGNOSIS — M17.12 PRIMARY OSTEOARTHRITIS OF LEFT KNEE: Primary | ICD-10-CM

## 2019-05-08 DIAGNOSIS — M22.2X1 PATELLOFEMORAL SYNDROME, RIGHT: ICD-10-CM

## 2019-05-08 PROCEDURE — 99213 OFFICE O/P EST LOW 20 MIN: CPT | Performed by: PHYSICIAN ASSISTANT

## 2019-05-08 NOTE — PROGRESS NOTES
3ML .5% BUPIVACAINE [facility]#[id number] LOT#[id number] EXP 11/20  2ML 30MG BETAMETHASONE [facility]#[id number] EXP 4/20  5ML 1 % LIDOCAINE [facility]#[id number] LOT#[id number] EXP 10/20  LT KNEE ASPIRATION AND INJECTION

## 2019-05-08 NOTE — PROGRESS NOTES
Chief Complaint    Knee Pain (bilat knee pain, getting worse, injections in october helped for a few months, tumeric has helped alot but the knees are still hurting)      History of Present Illness: Yaneth Perez is a 61 y.o. female returns today for reevaluation and treatment regarding both left and right knee pain. Patient is a known history of left knee osteoarthritis. She had arthroscopic surgery back in 2017 and was found to have grade 4 changes of the medial lateral compartment. We did do a cortisone injection back in October which did help decrease her pain for several months and she has been taking tumeric as an alternative the NSAIDs. She's also complaining of some right anterior knee pain. This typically tends to bother her more when going up and down stairs or getting out of a seated position.   This is not nearly as symptomatic as the left knee    Pain Assessment  Location of Pain: Knee  Location Modifiers: Right, Left  Severity of Pain: 8  Frequency of Pain: Intermittent  Aggravating Factors: Walking, Standing, Stairs  Limiting Behavior: Some  Result of Injury: No  Work-Related Injury: No  Are there other pain locations you wish to document?: No]       Medical History:  Past Medical History:   Diagnosis Date    Arthritis     Cervical disc disease     Colitis     Psoriasis     head, scalp     Patient Active Problem List    Diagnosis Date Noted    Primary osteoarthritis of both knees 10/17/2018    Tear of medial meniscus of left knee 10/09/2017    Chondromalacia of knee 10/09/2017    Tear of lateral meniscus of left knee 10/09/2017    Scapular dyskinesis 07/20/2017    Family history of breast cancer 05/24/2017    Chest wall pain 05/24/2017    Cervical radiculopathy 05/24/2017    Shoulder impingement syndrome 05/24/2017    Right shoulder pain 05/24/2017    Spinal stenosis of cervical region 05/24/2017     Current Outpatient Medications   Medication Sig Dispense Refill    etanercept (ENBREL) 25 MG injection Inject 25 mg into the skin once      OMEPRAZOLE PO Take 20 mg by mouth daily      BUDESONIDE PO Take 3 mg by mouth 3 times daily      Multiple Vitamins-Minerals (THERAPEUTIC MULTIVITAMIN-MINERALS) tablet Take 1 tablet by mouth daily      Cholecalciferol (VITAMIN D3) 2000 UNITS CAPS Take by mouth      Calcium Carb-Cholecalciferol (CALCIUM + D3 PO) Take by mouth       No current facility-administered medications for this visit. Review of Systems:  Relevant review of systems reviewed and available in the patient's chart    Vital Signs: There were no vitals filed for this visit. General Exam:   Constitutional: Patient is adequately groomed with no evidence of malnutrition  DTRs: Deep tendon reflexes are intact  Mental Status: The patient is oriented to time, place and person. The patient's mood and affect are appropriate. Lymphatic: The lymphatic examination bilaterally reveals all areas to be without enlargement or induration. Vascular: Examination reveals no swelling or calf tenderness. Peripheral pulses are palpable and 2+. Neurological: The patient has good coordination. There is no weakness or sensory deficit. Body mass index is 23.87 kg/m². Left Knee Examination:    Inspection:  No erythema or signs of infection. Moderate knee effusion. There are no cutaneous lesions    Palpation:  There is tenderness to palpation along the medial and lateral joint line. Range of Motion:  0 extension to 120 of active flexion. Strength:  4+/5 quadriceps and hamstrings    Special Tests: The knee is stable to varus valgus stressing/anterior posterior drawer. Negative Homans test.                                 Skin: There are no rashes, ulcerations or lesions. Gait: mildly antalgic favoring the left side    Reflex 2+ patellar    Examination of the right knee reveals intact skin.   Mild facet joint tenderness The patient demonstrates full painless range of motion with regard to flexion and extension. Mild retropatellar crepitation. Strength is 5/5 throughout all planes. Ligamentous stability is grossly intact. Examination of the left and right hip reveals intact skin. The patient demonstrates full painless range of motion with regards to flexion, abduction, internal and external rotation. There is no tenderness about the greater trochanter. There is a negative straight leg raise against resistance. Strength is 5/5 throughout all planes. Radiology:   X-rays obtained and reviewed in office:  Views 4 views including AP weightbearing, PA flexed weightbearing, lateral, and skyline  Location  left and right knee:    Impression:   There is continue to reveal reasonably well maintained joint space of all 3 compartments without evidence of high-grade arthritic changes. with sclerosis and osteophyte formation present. T  No fractures are identified. Impression:  Encounter Diagnoses   Name Primary?  Primary osteoarthritis of both knees Yes    Primary osteoarthritis of left knee        Office Procedures:  Orders Placed This Encounter   Procedures    XR KNEE RIGHT (MIN 4 VIEWS)     Standing Status:   Future     Number of Occurrences:   1     Standing Expiration Date:   5/3/2020    XR KNEE LEFT (MIN 4 VIEWS)     Standing Status:   Future     Number of Occurrences:   1     Standing Expiration Date:   5/3/2020    US ARTHR/ASP/INJ MAJOR JNT/BURSA LEFT     Standing Status:   Future     Number of Occurrences:   1     Standing Expiration Date:   5/8/2020    CA BETAMETHASONE ACET&SOD PHOSP    EUFLEXXA INJ PER DOSE (For Auth/Precert)     Standing Status:   Future     Standing Expiration Date:   5/7/2020     Procedure: Left knee aspiration and injection  I discussed in detail the risks, benefits, and complications of an aspiration/injection which included but is not limited to infection, skin reactions, hot swollen joints, and anaphylaxis with the patient.  The patient verbalized good understanding and gave informed consent for the left knee procedure. The patient was placed in the supine position on the exam table and the skin was prepped using sterile alcohol solution. The superiolateral aspect of the left knee was prepped and draped with sterile technique. The skin and subcutaneous tissues were anesthestized with 5 mL of 1% lidocaine, injected with a 22-gauge needle. After approximately 5 minutes, an 18-gauge needle attached to a 60-ml syringe was inserted into the knee. Then, 65 ml of synovial fluid was aspirated. The syringe was removed and, through the same needle, a mixture of 3mL of 0.5% bupivacaine, and 2ml of Betamethasone was injected. The needle was withdrawn and the puncture site sealed with a Band-Aid. Technique: Under sterile conditions a [employer] ultrasound unit with a variable frequency (6.0-15.0 MHz) linear transducer was used to localize the placement of a 22-gauge needle into the Knee. Findings: Successful needle placement for Knee Aspiration/injection. Final images were taken and saved for permanent record. The patient tolerated the injection well. The patient was instructed to call the office immediately if there is any pain, redness, warmth, fever, or chills. Treatment Plan:  I discussed with the patient the nature of osteoarthritis of the knee. Even though her radiographic findings do not look that bad she does have grade 4 changes of the medial lateral compartment as seen arthroscopically. We talked about treatment of arthritis and the various options that are involved with this. The patient understands that the treatments can vary from essentially doing nothing to a total joint replacement arthroplasty for arthritis. I then went on to describe the utilization of glucosamine and chondroitin sulfate as a joint nutrition product. We talked about the fact that this is essentially a joint vitamin with typically minimal side effects.  We also talked about utilization of prescription over-the-counter anti-inflammatory medications as the next option. We also discussed the possibility of brace wear or orthotic wear if the patient has significant varus alignment. We then went on to discuss the possibility of Visco supplementation with hyaluronate products. We talked about the typical course of this type of treatment and the fact that often times in the treatment for significant arthritis, this is successful less than half the time. We also talked about the corticosteroid injections and the fact that this can give a brief window of relief, but does not cure the problem; in fact, the pain often has a rebound effect in 6-10 weeks after the steroid has worn off. We also discussed arthroscopy surgery in attempts to debride the joint, but the fact that this is relatively unreliable treatment in the face of significant arthritis. It can occasionally be used, particularly if there is significant meniscus pathology. Lastly we discussed total joint replacement arthroplasty as the final and definitive step in treatment of arthritis. Patient realizes the magnitude of this type of treatment as well as having voiced a general understanding to the duration of the prosthesis. The patient voiced understanding to these continuum of treatment options. At this point for her left knee really go ahead and proceed with a left knee aspiration followed by cortisone injection. We will also get approval for Visco supplementation to be performed in her left knee. As far as her right knee is concerned this is only mildly symptomatic. Her symptoms are consistent with some patellofemoral syndrome.   She can stay on her tumor can continue therapeutic exercises and follow up as needed for the right knee

## 2019-05-17 ENCOUNTER — TELEPHONE (OUTPATIENT)
Dept: ORTHOPEDIC SURGERY | Age: 64
End: 2019-05-17

## 2019-06-12 ENCOUNTER — NURSE ONLY (OUTPATIENT)
Dept: ORTHOPEDIC SURGERY | Age: 64
End: 2019-06-12
Payer: COMMERCIAL

## 2019-06-12 DIAGNOSIS — M17.12 PRIMARY OSTEOARTHRITIS OF LEFT KNEE: Primary | ICD-10-CM

## 2019-06-12 PROCEDURE — 99999 PR OFFICE/OUTPT VISIT,PROCEDURE ONLY: CPT | Performed by: PHYSICIAN ASSISTANT

## 2019-06-19 ENCOUNTER — NURSE ONLY (OUTPATIENT)
Dept: ORTHOPEDIC SURGERY | Age: 64
End: 2019-06-19
Payer: COMMERCIAL

## 2019-06-19 DIAGNOSIS — M17.12 PRIMARY OSTEOARTHRITIS OF LEFT KNEE: Primary | ICD-10-CM

## 2019-06-19 PROCEDURE — 99999 PR OFFICE/OUTPT VISIT,PROCEDURE ONLY: CPT | Performed by: PHYSICIAN ASSISTANT

## 2019-06-19 NOTE — PROGRESS NOTES
Diagnosis: Left knee osteoarthritis     Procedure: Left knee Visco supplementation injection #2     The patient returns today for their second Euflexxa injection in the left knee. The risks, benefits, and complications of the injections were again discussed in detail with the patient. The risks discussed included but are not limited to infection, skin reactions, hot swollen joints, and anaphylaxis. The patient gave verbal informed consent for the injection. The patient skin was prepped with 3 sterile gauze pads soaked with alcohol solution and the knee joint was injected with 2 mL of Euflexxa intra-articularly under sterile conditions . Technique: Under sterile conditions a [employer] ultrasound unit with a variable frequency (6.0-15.0 MHz) linear transducer was used to localize the placement of a 22-gauge needle into the camelia joint. Findings: Successful needle placement for intra-articular Visco supplementation injection. Final images were taken and saved for permanent record. The patient tolerated the injection reasonably well. The patient was given instructions to ice the the knee and avoid strenuous activities for 24-48 hours. The patient was instructed to call the office immediately if there is increased pain, redness, warmth, fever, or chills. We will see the patient back in one week for the third injection.

## 2019-06-26 ENCOUNTER — NURSE ONLY (OUTPATIENT)
Dept: ORTHOPEDIC SURGERY | Age: 64
End: 2019-06-26
Payer: COMMERCIAL

## 2019-06-26 DIAGNOSIS — M17.0 PRIMARY OSTEOARTHRITIS OF BOTH KNEES: Primary | ICD-10-CM

## 2019-06-26 PROCEDURE — 99999 PR OFFICE/OUTPT VISIT,PROCEDURE ONLY: CPT | Performed by: PHYSICIAN ASSISTANT

## 2020-01-27 ENCOUNTER — OFFICE VISIT (OUTPATIENT)
Dept: ORTHOPEDIC SURGERY | Age: 65
End: 2020-01-27
Payer: COMMERCIAL

## 2020-01-27 VITALS — WEIGHT: 163 LBS | BODY MASS INDEX: 25.58 KG/M2 | HEIGHT: 67 IN

## 2020-01-27 PROCEDURE — 99213 OFFICE O/P EST LOW 20 MIN: CPT | Performed by: ORTHOPAEDIC SURGERY

## 2020-01-27 RX ORDER — BETAMETHASONE SODIUM PHOSPHATE AND BETAMETHASONE ACETATE 3; 3 MG/ML; MG/ML
24 INJECTION, SUSPENSION INTRA-ARTICULAR; INTRALESIONAL; INTRAMUSCULAR; SOFT TISSUE ONCE
Status: COMPLETED | OUTPATIENT
Start: 2020-01-27 | End: 2020-01-27

## 2020-01-27 RX ADMIN — BETAMETHASONE SODIUM PHOSPHATE AND BETAMETHASONE ACETATE 24 MG: 3; 3 INJECTION, SUSPENSION INTRA-ARTICULAR; INTRALESIONAL; INTRAMUSCULAR; SOFT TISSUE at 15:30

## 2020-01-27 NOTE — PROGRESS NOTES
05/24/2017    Spinal stenosis of cervical region 05/24/2017     Past Surgical History:   Procedure Laterality Date    BREAST BIOPSY Bilateral 1980's    COLONOSCOPY  6/8/15    polyps and before that 4 colonoscopies    COLONOSCOPY  11/02/2017    biopsies,polyps,    FOOT SURGERY Left 1974    foreign body removal- sewing needle    KNEE ARTHROSCOPY Left 11/10/2017    TONSILLECTOMY      UPPER GASTROINTESTINAL ENDOSCOPY  7/31/2015    WISDOM TOOTH EXTRACTION       Family History   Problem Relation Age of Onset    Other Mother         failure to thrive    Other Father         complications of gallbladder surgery     Social History     Socioeconomic History    Marital status:      Spouse name: None    Number of children: None    Years of education: None    Highest education level: None   Occupational History    None   Social Needs    Financial resource strain: None    Food insecurity:     Worry: None     Inability: None    Transportation needs:     Medical: None     Non-medical: None   Tobacco Use    Smoking status: Never Smoker    Smokeless tobacco: Never Used   Substance and Sexual Activity    Alcohol use:  Yes     Alcohol/week: 7.0 - 14.0 standard drinks     Types: 7 - 14 Glasses of wine per week    Drug use: None    Sexual activity: None   Lifestyle    Physical activity:     Days per week: None     Minutes per session: None    Stress: None   Relationships    Social connections:     Talks on phone: None     Gets together: None     Attends Bahai service: None     Active member of club or organization: None     Attends meetings of clubs or organizations: None     Relationship status: None    Intimate partner violence:     Fear of current or ex partner: None     Emotionally abused: None     Physically abused: None     Forced sexual activity: None   Other Topics Concern    None   Social History Narrative    None     Current Outpatient Medications   Medication Sig Dispense Refill    abduction, internal and external rotation. There is no tenderness about the greater trochanter. There is a negative straight leg raise against resistance. Strength is 5/5 throughout all planes. Radiology:   X-rays obtained and reviewed in office:  Views 4 views including AP weightbearing, PA flexed weightbearing, lateral, and skyline  Location right and left knee:    Impression:   There is mild thinning of the medial joint surface with sclerosis and osteophyte formation present. The patellofemoral joint well-maintained  No fractures are identified. Impression:  Encounter Diagnoses   Name Primary?  Primary osteoarthritis of both knees Yes    Knee effusion, left     Rheumatoid arthritis, involving unspecified site, unspecified rheumatoid factor presence ([facility] [address]        Office Procedures:  Orders Placed This Encounter   Procedures    XR KNEE LEFT (MIN 4 VIEWS)     Standing Status:   Future     Number of Occurrences:   1     Standing Expiration Date:   1/22/2021    XR KNEE RIGHT (MIN 4 VIEWS)     Standing Status:   Future     Number of Occurrences:   1     Standing Expiration Date:   1/22/2021    US ARTHR/ASP/INJ MAJOR JNT/BURSA LEFT     Standing Status:   Future     Number of Occurrences:   1     Standing Expiration Date:   1/27/2021    [id number] - [place] DRAIN/INJECT LARGE JOINT/BURSA     Left knee aspiration injection    I discussed in detail the risks, benefits, and complications of an aspiration/injection which included but is not limited to infection, skin reactions, hot swollen joints, and anaphylaxis with the patient. The patient verbalized good understanding and gave informed consent for the left knee procedure. The patient was placed in the supine position on the exam table and the skin was prepped using sterile alcohol solution. The superiolateral aspect of the left knee was prepped and draped with sterile technique.  The skin and subcutaneous tissues were anesthestized with 5 mL of 1% lidocaine, injected options. Patient will proceed with a left knee aspiration and cortisone injection. We will also perform a right knee cortisone injection. Patient she should absolutely figure out her rheumatology condition. We will see her back on a as needed basis.   Plans on switching rheumatologist.

## 2020-01-28 ENCOUNTER — TELEPHONE (OUTPATIENT)
Dept: ORTHOPEDIC SURGERY | Age: 65
End: 2020-01-28